# Patient Record
Sex: MALE | Race: WHITE | NOT HISPANIC OR LATINO | Employment: OTHER | URBAN - NONMETROPOLITAN AREA
[De-identification: names, ages, dates, MRNs, and addresses within clinical notes are randomized per-mention and may not be internally consistent; named-entity substitution may affect disease eponyms.]

---

## 2017-12-07 ENCOUNTER — OFFICE VISIT (OUTPATIENT)
Dept: ORTHOPEDIC SURGERY | Facility: CLINIC | Age: 72
End: 2017-12-07

## 2017-12-07 VITALS
DIASTOLIC BLOOD PRESSURE: 92 MMHG | WEIGHT: 199 LBS | HEIGHT: 69 IN | SYSTOLIC BLOOD PRESSURE: 151 MMHG | HEART RATE: 63 BPM | BODY MASS INDEX: 29.47 KG/M2

## 2017-12-07 DIAGNOSIS — S82.831A OTHER CLOSED FRACTURE OF DISTAL END OF RIGHT FIBULA, INITIAL ENCOUNTER: Primary | ICD-10-CM

## 2017-12-07 PROCEDURE — 27786 TREATMENT OF ANKLE FRACTURE: CPT | Performed by: PHYSICIAN ASSISTANT

## 2017-12-07 RX ORDER — TESTOSTERONE CYPIONATE 200 MG/ML
INJECTION, SOLUTION INTRAMUSCULAR
Refills: 0 | COMMUNITY
Start: 2017-11-02
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
## 2017-12-12 DIAGNOSIS — M25.571 RIGHT ANKLE PAIN, UNSPECIFIED CHRONICITY: Primary | ICD-10-CM

## 2017-12-14 ENCOUNTER — OFFICE VISIT (OUTPATIENT)
Dept: ORTHOPEDIC SURGERY | Facility: CLINIC | Age: 72
End: 2017-12-14

## 2017-12-14 ENCOUNTER — HOSPITAL ENCOUNTER (OUTPATIENT)
Dept: GENERAL RADIOLOGY | Facility: HOSPITAL | Age: 72
Discharge: HOME | End: 2017-12-14
Admitting: PHYSICIAN ASSISTANT

## 2017-12-14 VITALS — BODY MASS INDEX: 39.07 KG/M2 | HEIGHT: 60 IN | WEIGHT: 199 LBS

## 2017-12-14 DIAGNOSIS — M25.571 RIGHT ANKLE PAIN, UNSPECIFIED CHRONICITY: ICD-10-CM

## 2017-12-14 DIAGNOSIS — S82.831D OTHER CLOSED FRACTURE OF DISTAL END OF RIGHT FIBULA WITH ROUTINE HEALING, SUBSEQUENT ENCOUNTER: Primary | ICD-10-CM

## 2017-12-14 PROCEDURE — 73610 X-RAY EXAM OF ANKLE: CPT

## 2017-12-14 PROCEDURE — 99024 POSTOP FOLLOW-UP VISIT: CPT | Performed by: PHYSICIAN ASSISTANT

## 2017-12-14 PROCEDURE — 73610 X-RAY EXAM OF ANKLE: CPT | Performed by: RADIOLOGY

## 2017-12-14 NOTE — PROGRESS NOTES
Patient: Meg Trejo  YOB: 1945  Date of Encounter: 12/14/2017        History of Present Illness:     Meg Trejo is a 72 y.o. male seen today for 1 week follow-up and repeat x-ray in cast of nondisplaced right distal fibula fracture.  Patient's last office visit he was immobilized in a short leg cast.  Patient states that over the last 2 days he has had increasing pain, swelling and tightness within the cast.  He reports coldness of his toes and numbness and tingling.  Mild pain to the lateral aspect of the ankle as previous.  Denies any repeat injuries.  He has attempted to maximally elevated with no improvement of the tightness and swelling.     Social History     Occupational History   • Not on file.     Social History Main Topics   • Smoking status: Former Smoker   • Smokeless tobacco: Never Used   • Alcohol use Yes   • Drug use: No   • Sexual activity: Defer       Physical Exam: 72 y.o. male .  General Appearance:    Well appearing, cooperative, in no acute distress.       Patient is alert and oriented x 3.          Musculoskeletal: Examination today the patient's right lower extremity upon removal of short leg fiberglass cast reveals he has moderate generalized swelling into the lower extremity starting at the mid calf and progressing down to the toes.  He has quite noticeable swelling to the dorsum of the foot with 1+ dorsalis pedis pulse, equal to the contralateral foot.  Patient does have intact sensation to palpation with the digits cool to touch.  Patient has limited range of motion secondary to pain and notable fracture.        Radiology:     x-rays: AP, lateral, oblique views right ankle reveals nondisplaced distal fibula fracture without evidence of change in alignment or positioning.  Mortise remains intact.      Assessment:    ICD-10-CM ICD-9-CM   1. Other closed fracture of distal end of right fibula with routine healing, subsequent encounter S82.831D V54.16        Plan:  Patient remained out of the short leg fiberglass cast and he was placed into an equalizer boot that he may remove upon altered sensation.  Patient was encouraged to at time of removal the boot does not implement range of motion of the ankle.  He may remove this for bathing purposes as well.  Patient will continue maximum elevation to help alleviate pain and swelling as well as extensive ecchymosis noted into the lateral ankle.  Patient was instructed that if significant calf tenderness warmth, posterior leg pain and he would report to the emergency department for further evaluation.  Patient reported back in one week for repeat x-rays and further alignment check.      This document was signed by Champ Amaya PA-C December 14, 2017       CC: CHESTER Dougherty

## 2017-12-20 DIAGNOSIS — M25.571 RIGHT ANKLE PAIN, UNSPECIFIED CHRONICITY: Primary | ICD-10-CM

## 2017-12-21 ENCOUNTER — HOSPITAL ENCOUNTER (OUTPATIENT)
Dept: GENERAL RADIOLOGY | Facility: HOSPITAL | Age: 72
Discharge: HOME | End: 2017-12-21
Admitting: PHYSICIAN ASSISTANT

## 2017-12-21 ENCOUNTER — OFFICE VISIT (OUTPATIENT)
Dept: ORTHOPEDIC SURGERY | Facility: CLINIC | Age: 72
End: 2017-12-21

## 2017-12-21 DIAGNOSIS — S82.831D OTHER CLOSED FRACTURE OF DISTAL END OF RIGHT FIBULA WITH ROUTINE HEALING, SUBSEQUENT ENCOUNTER: Primary | ICD-10-CM

## 2017-12-21 DIAGNOSIS — M25.571 RIGHT ANKLE PAIN, UNSPECIFIED CHRONICITY: ICD-10-CM

## 2017-12-21 PROCEDURE — 99024 POSTOP FOLLOW-UP VISIT: CPT | Performed by: PHYSICIAN ASSISTANT

## 2017-12-21 PROCEDURE — 73610 X-RAY EXAM OF ANKLE: CPT | Performed by: RADIOLOGY

## 2017-12-21 PROCEDURE — 73610 X-RAY EXAM OF ANKLE: CPT

## 2017-12-21 NOTE — PROGRESS NOTES
Patient: Meg Trejo  YOB: 1945  Date of Encounter: 12/21/2017        History of Present Illness:     Meg Trejo is a 72 y.o. male seen today for repeat x-rays alignment checked.  Patient is now 2 weeks status post distal fibula fracture right ankle.  Patient reports that the swelling has gone down and he has been compliant with the equalizer boot nonweightbearing status.  He will still have numbness and tingling of the toes upon a dependent position.  He reports progressive swelling and bruising into the digits with continued cold sensation that is comparable to the contralateral foot.  No new complaints.  Denies paresthesias    Social History     Occupational History   • Not on file.     Social History Main Topics   • Smoking status: Former Smoker   • Smokeless tobacco: Never Used   • Alcohol use Yes   • Drug use: No   • Sexual activity: Defer       Physical Exam: 72 y.o. male .  General Appearance:    Well appearing, cooperative, in no acute distress.       Patient is alert and oriented x 3.          Musculoskeletal: Examination the patient's right lower extremity upon removal of the equalizer boot reveals there is moderate generalized swelling to her lower extremity starting at the mid calf and progressing down into the toes.  He has noticeable swelling to the dorsum of the foot with 1+ dorsalis pedis pulse and tibialis posterior.  This is comparable to contralateral foot.  Patient does have intact sensation to light palpation.  Full range of motion of his toes.  Limited range of motion of the ankle secondary to pain and notable fracture.  Neurovascular status grossly intact.      Radiology:       3 views right ankle x-ray today reveals no change in alignment or positioning of the distal fibula fracture.  There is no significant change in mortise alignment or positioning.      Assessment:    ICD-10-CM ICD-9-CM   1. Other closed fracture of distal end of right fibula with routine healing,  subsequent encounter S82.831D V54.16       Plan:    Radiographs continue to reveal stability of the nondisplaced distal fibula fracture.  Mortise is maintained.  He will continue with the equalizer boot with nonweightbearing status over the next 4 weeks.  Repeat x-rays for alignment check and evaluation of periosteal callus formation.  Patient will continue to adhere to maximal elevation to help alleviate the overall pain and swelling and numbness symptoms.    This document was signed by Champ Amaya PA-C December 21, 2017       CC: CHESTER Dougherty

## 2017-12-28 NOTE — PROGRESS NOTES
I have reviewed the notes, assessments, and/or procedures performed by Champ Amaya PA-C, I concur with her/his documentation of Meg Trejo.

## 2018-01-16 DIAGNOSIS — S82.831D OTHER CLOSED FRACTURE OF DISTAL END OF RIGHT FIBULA WITH ROUTINE HEALING, SUBSEQUENT ENCOUNTER: Primary | ICD-10-CM

## 2018-01-18 ENCOUNTER — HOSPITAL ENCOUNTER (OUTPATIENT)
Dept: GENERAL RADIOLOGY | Facility: HOSPITAL | Age: 73
Discharge: HOME | End: 2018-01-18
Admitting: PHYSICIAN ASSISTANT

## 2018-01-18 ENCOUNTER — OFFICE VISIT (OUTPATIENT)
Dept: ORTHOPEDIC SURGERY | Facility: CLINIC | Age: 73
End: 2018-01-18

## 2018-01-18 VITALS — HEIGHT: 60 IN

## 2018-01-18 DIAGNOSIS — S82.831D OTHER CLOSED FRACTURE OF DISTAL END OF RIGHT FIBULA WITH ROUTINE HEALING, SUBSEQUENT ENCOUNTER: ICD-10-CM

## 2018-01-18 DIAGNOSIS — S82.831D OTHER CLOSED FRACTURE OF DISTAL END OF RIGHT FIBULA WITH ROUTINE HEALING, SUBSEQUENT ENCOUNTER: Primary | ICD-10-CM

## 2018-01-18 PROCEDURE — 73610 X-RAY EXAM OF ANKLE: CPT | Performed by: RADIOLOGY

## 2018-01-18 PROCEDURE — 73610 X-RAY EXAM OF ANKLE: CPT

## 2018-01-18 PROCEDURE — 99024 POSTOP FOLLOW-UP VISIT: CPT | Performed by: PHYSICIAN ASSISTANT

## 2018-01-18 NOTE — PROGRESS NOTES
Patient: Meg Trejo  YOB: 1945  Date of Encounter: 01/18/2018        History of Present Illness:     Meg Trejo is a 72 y.o. male seen today secondary to 6 week follow-up right distal fibula fracture.  He has been immobilized equalizer boot since last office visit.  Patient is now six-week status post initial injury.  He reports mild overall swelling with reduction of the majority of the swelling.  Resolution of the majority of the bruising as well.  He has been compliant with nonweightbearing status.  Denies any new complaints paraesthesias.    Social History     Occupational History   • Not on file.     Social History Main Topics   • Smoking status: Former Smoker   • Smokeless tobacco: Never Used   • Alcohol use Yes   • Drug use: No   • Sexual activity: Defer       Physical Exam: 72 y.o. male .  General Appearance:    Well appearing, cooperative, in no acute distress.       Patient is alert and oriented x 3.          Musculoskeletal: Examination the patient's right ankle reveals mild generalized swelling with minimal tenderness to palpation along the distal fibula.  Resolving ecchymosis to the web spaces and to the digits.  Full range of motion with neurovascular status grossly intact      Radiology:       3 views x-rays right ankle reveals no changes in alignment or positioning to the distal fibula fracture.  No notable periosteal callus formation.  Mortise is intact    Assessment:    ICD-10-CM ICD-9-CM   1. Other closed fracture of distal end of right fibula with routine healing, subsequent encounter S82.831D V54.16       Plan:    Patient may begin to slowly ambulate in the equalizer boot for an additional 2 weeks.  He may remove this for gentle range of motion purposes.  The patient will return back for repeat x-rays and further evaluation for determination of early periosteal callus formation.    This document was signed by Champ Amaya PA-C January 18, 2018       CC: Thelma Yoo  APRN

## 2018-01-30 DIAGNOSIS — S82.831D OTHER CLOSED FRACTURE OF DISTAL END OF RIGHT FIBULA WITH ROUTINE HEALING, SUBSEQUENT ENCOUNTER: Primary | ICD-10-CM

## 2018-02-01 ENCOUNTER — OFFICE VISIT (OUTPATIENT)
Dept: ORTHOPEDIC SURGERY | Facility: CLINIC | Age: 73
End: 2018-02-01

## 2018-02-01 ENCOUNTER — HOSPITAL ENCOUNTER (OUTPATIENT)
Dept: GENERAL RADIOLOGY | Facility: HOSPITAL | Age: 73
Discharge: HOME | End: 2018-02-01
Admitting: PHYSICIAN ASSISTANT

## 2018-02-01 VITALS — WEIGHT: 199 LBS | BODY MASS INDEX: 29.47 KG/M2 | HEIGHT: 69 IN

## 2018-02-01 DIAGNOSIS — S82.831D OTHER CLOSED FRACTURE OF DISTAL END OF RIGHT FIBULA WITH ROUTINE HEALING, SUBSEQUENT ENCOUNTER: Primary | ICD-10-CM

## 2018-02-01 PROBLEM — M25.571 RIGHT ANKLE PAIN: Status: ACTIVE | Noted: 2018-02-01

## 2018-02-01 PROCEDURE — 99024 POSTOP FOLLOW-UP VISIT: CPT | Performed by: PHYSICIAN ASSISTANT

## 2018-02-01 PROCEDURE — 73610 X-RAY EXAM OF ANKLE: CPT

## 2018-02-01 PROCEDURE — 73610 X-RAY EXAM OF ANKLE: CPT | Performed by: RADIOLOGY

## 2018-02-01 NOTE — PROGRESS NOTES
Patient: Meg Trejo  YOB: 1945  Date of Encounter: 02/01/2018        History of Present Illness:     Meg Trejo is a 72 y.o. male seen today for scheduled follow up now 8 weeks s/p right distal fibula fracture. He was progressed to ambulating in equalizer boot at last visit. He has been improving with only mild pain upon prolonged standing or walking. No other new complaints. Denies any skin changes or paraesthesias.     Social History     Occupational History   • Not on file.     Social History Main Topics   • Smoking status: Former Smoker   • Smokeless tobacco: Never Used   • Alcohol use Yes   • Drug use: No   • Sexual activity: Defer       Body mass index is 29.39 kg/(m^2).      Physical Exam: 72 y.o. male .  General Appearance:    Well appearing, cooperative, in no acute distress.       Patient is alert and oriented x 3.          Musculoskeletal: right ankle on exam today reveals mild generalized swelling, improved from last exam with resolved ecchymosis. Full ROM, without tenderness to palpation along the lateral ligament complex or distal fibula. NV status remains grossly intact.       Radiology:       XR: 3 views right ankle reveal continued evidence of distal fibula fracture without change in alignment or positioning. No notable callus formation.       Assessment:    ICD-10-CM ICD-9-CM   1. Chronic pain of right ankle M25.571 719.47    G89.29 338.29       Plan:    Progression into normal shoe wear. He is to exercise caution with activity and to avoid re injury. He will return in 4 weeks for final global evaluation. Anticipate periosteal callus formation surrounding at that time. Ice, and relative rest modalities as necessary.         Patient's BMI is above normal parameters. Follow-up plan includes:  educational material.      This document was signed by Champ Amaya PA-C February 1, 2018       CC: CHESTER Dougherty

## 2018-03-01 DIAGNOSIS — S82.831D OTHER CLOSED FRACTURE OF DISTAL END OF RIGHT FIBULA WITH ROUTINE HEALING, SUBSEQUENT ENCOUNTER: Primary | ICD-10-CM

## 2018-03-02 ENCOUNTER — HOSPITAL ENCOUNTER (OUTPATIENT)
Dept: GENERAL RADIOLOGY | Facility: HOSPITAL | Age: 73
Discharge: HOME | End: 2018-03-02
Admitting: PHYSICIAN ASSISTANT

## 2018-03-02 ENCOUNTER — OFFICE VISIT (OUTPATIENT)
Dept: ORTHOPEDIC SURGERY | Facility: CLINIC | Age: 73
End: 2018-03-02

## 2018-03-02 VITALS — HEIGHT: 69 IN | BODY MASS INDEX: 29.47 KG/M2 | WEIGHT: 199 LBS

## 2018-03-02 DIAGNOSIS — S82.831D OTHER CLOSED FRACTURE OF DISTAL END OF RIGHT FIBULA WITH ROUTINE HEALING, SUBSEQUENT ENCOUNTER: Primary | ICD-10-CM

## 2018-03-02 DIAGNOSIS — R20.8 DECREASED SENSATION OF FOOT: ICD-10-CM

## 2018-03-02 DIAGNOSIS — R09.89 OTHER SPECIFIED SYMPTOMS AND SIGNS INVOLVING THE CIRCULATORY AND RESPIRATORY SYSTEMS: ICD-10-CM

## 2018-03-02 DIAGNOSIS — S82.831D OTHER CLOSED FRACTURE OF DISTAL END OF RIGHT FIBULA WITH ROUTINE HEALING, SUBSEQUENT ENCOUNTER: ICD-10-CM

## 2018-03-02 PROCEDURE — 73610 X-RAY EXAM OF ANKLE: CPT

## 2018-03-02 PROCEDURE — 99024 POSTOP FOLLOW-UP VISIT: CPT | Performed by: PHYSICIAN ASSISTANT

## 2018-03-02 PROCEDURE — 73610 X-RAY EXAM OF ANKLE: CPT | Performed by: RADIOLOGY

## 2018-03-06 ENCOUNTER — HOSPITAL ENCOUNTER (OUTPATIENT)
Dept: CARDIOLOGY | Facility: HOSPITAL | Age: 73
Discharge: HOME | End: 2018-03-06
Admitting: PHYSICIAN ASSISTANT

## 2018-03-06 PROCEDURE — 93926 LOWER EXTREMITY STUDY: CPT

## 2018-03-06 PROCEDURE — 93926 LOWER EXTREMITY STUDY: CPT | Performed by: RADIOLOGY

## 2018-03-07 ENCOUNTER — OFFICE VISIT (OUTPATIENT)
Dept: ORTHOPEDIC SURGERY | Facility: CLINIC | Age: 73
End: 2018-03-07

## 2018-03-07 VITALS — WEIGHT: 199 LBS | BODY MASS INDEX: 29.47 KG/M2 | HEIGHT: 69 IN

## 2018-03-07 DIAGNOSIS — S82.831D OTHER CLOSED FRACTURE OF DISTAL END OF RIGHT FIBULA WITH ROUTINE HEALING, SUBSEQUENT ENCOUNTER: Primary | ICD-10-CM

## 2018-03-07 PROCEDURE — 99024 POSTOP FOLLOW-UP VISIT: CPT | Performed by: PHYSICIAN ASSISTANT

## 2018-03-07 NOTE — PROGRESS NOTES
"Meg Trejo   :1945    Date of encounter:2018        HPI:  Meg Trejo is a 73 y.o. male who returns here today for follow-up of recent arterial Doppler.  He is 3 months out from a distal fibula fracture and at his last office visit had significant discoloration and decreased sensation in his foot.  He states that this has improved since his last visit.  He states he's noticed that the discoloration his foot does not seem to be as bad.  He states of it does occur now if he stops and elevates his foot and will subside.  He denies paresthesias.    PMH:   Patient Active Problem List   Diagnosis   • Right ankle pain       Exam:  General Appearance:    73 y.o. male  cooperative, in no acute distress.  Alert and oriented x 3,                   Vitals:    18 1055   Weight: 90.3 kg (199 lb)   Height: 175.3 cm (69\")          Body mass index is 29.39 kg/(m^2).   Examination of the right foot and ankle today reveal minimal swelling.  He has no tenderness on palpation.  He has normal coloration of the foot and good pedal pulse.    Radiology:   Arterial Doppler was normal.    Assessment    ICD-10-CM ICD-9-CM   1. Other closed fracture of distal end of right fibula with routine healing, subsequent encounter S82.831D V54.16       Plan:   73-year-old male who is 3 months out from a distal fibula fracture.  He had a recent arterial Doppler obtained which was found to be normal.  Swelling and sensation of the right lower extremity are improved today.  We have advised to continue weightbearing as tolerated in a regular shoe.  He'll continue to elevate and ice as needed.  He'll return back in 6 weeks for repeat x-rays and evaluation.    Izzy VALENTIN                    "

## 2018-03-13 ENCOUNTER — TRANSCRIBE ORDERS (OUTPATIENT)
Dept: ADMINISTRATIVE | Facility: HOSPITAL | Age: 73
End: 2018-03-13

## 2018-03-13 DIAGNOSIS — H53.2 DOUBLE VISION: Primary | ICD-10-CM

## 2018-03-16 ENCOUNTER — HOSPITAL ENCOUNTER (OUTPATIENT)
Dept: MRI IMAGING | Facility: HOSPITAL | Age: 73
Discharge: HOME | End: 2018-03-16
Admitting: NURSE PRACTITIONER

## 2018-03-16 DIAGNOSIS — H53.2 DOUBLE VISION: ICD-10-CM

## 2018-03-16 PROCEDURE — 70551 MRI BRAIN STEM W/O DYE: CPT | Performed by: RADIOLOGY

## 2018-03-16 PROCEDURE — 70551 MRI BRAIN STEM W/O DYE: CPT

## 2018-03-21 ENCOUNTER — OFFICE VISIT (OUTPATIENT)
Dept: CARDIOLOGY | Facility: CLINIC | Age: 73
End: 2018-03-21

## 2018-03-21 ENCOUNTER — HOSPITAL ENCOUNTER (OUTPATIENT)
Dept: RESPIRATORY THERAPY | Facility: HOSPITAL | Age: 73
Discharge: HOME | End: 2018-03-21
Attending: INTERNAL MEDICINE | Admitting: INTERNAL MEDICINE

## 2018-03-21 VITALS
DIASTOLIC BLOOD PRESSURE: 87 MMHG | RESPIRATION RATE: 16 BRPM | BODY MASS INDEX: 28.94 KG/M2 | SYSTOLIC BLOOD PRESSURE: 139 MMHG | HEIGHT: 69 IN | HEART RATE: 50 BPM | WEIGHT: 195.4 LBS

## 2018-03-21 DIAGNOSIS — I49.1 PAC (PREMATURE ATRIAL CONTRACTION): ICD-10-CM

## 2018-03-21 DIAGNOSIS — I49.1 PAC (PREMATURE ATRIAL CONTRACTION): Primary | ICD-10-CM

## 2018-03-21 DIAGNOSIS — M79.89 LEG SWELLING: ICD-10-CM

## 2018-03-21 PROCEDURE — 93226 XTRNL ECG REC<48 HR SCAN A/R: CPT

## 2018-03-21 PROCEDURE — 93225 XTRNL ECG REC<48 HRS REC: CPT

## 2018-03-21 PROCEDURE — 93000 ELECTROCARDIOGRAM COMPLETE: CPT | Performed by: INTERNAL MEDICINE

## 2018-03-21 PROCEDURE — 99204 OFFICE O/P NEW MOD 45 MIN: CPT | Performed by: INTERNAL MEDICINE
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
## 2018-03-23 PROCEDURE — 93227 XTRNL ECG REC<48 HR R&I: CPT | Performed by: INTERNAL MEDICINE

## 2018-03-28 ENCOUNTER — HOSPITAL ENCOUNTER (OUTPATIENT)
Dept: CARDIOLOGY | Facility: HOSPITAL | Age: 73
Discharge: HOME | End: 2018-03-28
Attending: INTERNAL MEDICINE

## 2018-03-28 ENCOUNTER — HOSPITAL ENCOUNTER (OUTPATIENT)
Dept: CARDIOLOGY | Facility: HOSPITAL | Age: 73
Discharge: HOME | End: 2018-03-28
Attending: INTERNAL MEDICINE | Admitting: INTERNAL MEDICINE

## 2018-03-28 DIAGNOSIS — M79.89 LEG SWELLING: ICD-10-CM

## 2018-03-28 DIAGNOSIS — I49.1 PAC (PREMATURE ATRIAL CONTRACTION): ICD-10-CM

## 2018-03-28 LAB
BH CV ECHO MEAS - % IVS THICK: 35.5 %
BH CV ECHO MEAS - % LVPW THICK: 64.4 %
BH CV ECHO MEAS - ACS: 2.1 CM
BH CV ECHO MEAS - AO MAX PG: 10.6 MMHG
BH CV ECHO MEAS - AO MEAN PG: 5.1 MMHG
BH CV ECHO MEAS - AO ROOT AREA (BSA CORRECTED): 1.6
BH CV ECHO MEAS - AO ROOT AREA: 8.1 CM^2
BH CV ECHO MEAS - AO ROOT DIAM: 3.2 CM
BH CV ECHO MEAS - AO V2 MAX: 162.9 CM/SEC
BH CV ECHO MEAS - AO V2 MEAN: 103.6 CM/SEC
BH CV ECHO MEAS - AO V2 VTI: 33.6 CM
BH CV ECHO MEAS - BSA(HAYCOCK): 2.1 M^2
BH CV ECHO MEAS - BSA: 2 M^2
BH CV ECHO MEAS - BZI_BMI: 28.8 KILOGRAMS/M^2
BH CV ECHO MEAS - BZI_METRIC_HEIGHT: 175.3 CM
BH CV ECHO MEAS - BZI_METRIC_WEIGHT: 88.5 KG
BH CV ECHO MEAS - CONTRAST EF 4CH: 55.9 ML/M^2
BH CV ECHO MEAS - EDV(CUBED): 106.1 ML
BH CV ECHO MEAS - EDV(MOD-SP4): 59 ML
BH CV ECHO MEAS - EDV(TEICH): 104.1 ML
BH CV ECHO MEAS - EF(CUBED): 69 %
BH CV ECHO MEAS - EF(MOD-SP4): 55.9 %
BH CV ECHO MEAS - EF(TEICH): 60.6 %
BH CV ECHO MEAS - ESV(CUBED): 32.9 ML
BH CV ECHO MEAS - ESV(MOD-SP4): 26 ML
BH CV ECHO MEAS - ESV(TEICH): 41.1 ML
BH CV ECHO MEAS - FS: 32.3 %
BH CV ECHO MEAS - IVS/LVPW: 0.83
BH CV ECHO MEAS - IVSD: 0.9 CM
BH CV ECHO MEAS - IVSS: 1.2 CM
BH CV ECHO MEAS - LA DIMENSION: 2.6 CM
BH CV ECHO MEAS - LA/AO: 0.82
BH CV ECHO MEAS - LV DIASTOLIC VOL/BSA (35-75): 28.9 ML/M^2
BH CV ECHO MEAS - LV MASS(C)D: 164.1 GRAMS
BH CV ECHO MEAS - LV MASS(C)DI: 80.3 GRAMS/M^2
BH CV ECHO MEAS - LV MASS(C)S: 171.4 GRAMS
BH CV ECHO MEAS - LV MASS(C)SI: 83.9 GRAMS/M^2
BH CV ECHO MEAS - LV SYSTOLIC VOL/BSA (12-30): 12.7 ML/M^2
BH CV ECHO MEAS - LVIDD: 4.7 CM
BH CV ECHO MEAS - LVIDS: 3.2 CM
BH CV ECHO MEAS - LVLD AP4: 7.4 CM
BH CV ECHO MEAS - LVLS AP4: 6.6 CM
BH CV ECHO MEAS - LVOT AREA (M): 2.8 CM^2
BH CV ECHO MEAS - LVOT AREA: 2.7 CM^2
BH CV ECHO MEAS - LVOT DIAM: 1.9 CM
BH CV ECHO MEAS - LVPWD: 1.1 CM
BH CV ECHO MEAS - LVPWS: 1.8 CM
BH CV ECHO MEAS - MV A MAX VEL: 57.4 CM/SEC
BH CV ECHO MEAS - MV E MAX VEL: 88.8 CM/SEC
BH CV ECHO MEAS - MV E/A: 1.5
BH CV ECHO MEAS - PA ACC SLOPE: 1927 CM/SEC^2
BH CV ECHO MEAS - PA ACC TIME: 0.06 SEC
BH CV ECHO MEAS - PA PR(ACCEL): 53.6 MMHG
BH CV ECHO MEAS - RAP SYSTOLE: 10 MMHG
BH CV ECHO MEAS - RVSP: 38.6 MMHG
BH CV ECHO MEAS - SI(AO): 133.2 ML/M^2
BH CV ECHO MEAS - SI(CUBED): 35.8 ML/M^2
BH CV ECHO MEAS - SI(MOD-SP4): 16.1 ML/M^2
BH CV ECHO MEAS - SI(TEICH): 30.9 ML/M^2
BH CV ECHO MEAS - SV(AO): 272.1 ML
BH CV ECHO MEAS - SV(CUBED): 73.3 ML
BH CV ECHO MEAS - SV(MOD-SP4): 33 ML
BH CV ECHO MEAS - SV(TEICH): 63.1 ML
BH CV ECHO MEAS - TR MAX VEL: 267.3 CM/SEC
LV EF 2D ECHO EST: 65 %
MAXIMAL PREDICTED HEART RATE: 149 BPM
STRESS TARGET HR: 127 BPM

## 2018-03-28 PROCEDURE — 93306 TTE W/DOPPLER COMPLETE: CPT

## 2018-03-28 PROCEDURE — 93306 TTE W/DOPPLER COMPLETE: CPT | Performed by: INTERNAL MEDICINE

## 2018-03-28 PROCEDURE — 93971 EXTREMITY STUDY: CPT | Performed by: RADIOLOGY

## 2018-03-28 PROCEDURE — 93971 EXTREMITY STUDY: CPT

## 2018-04-17 DIAGNOSIS — S82.831D CLOSED FRACTURE OF DISTAL END OF RIGHT FIBULA WITH ROUTINE HEALING, UNSPECIFIED FRACTURE MORPHOLOGY, SUBSEQUENT ENCOUNTER: Primary | ICD-10-CM

## 2018-04-18 ENCOUNTER — OFFICE VISIT (OUTPATIENT)
Dept: ORTHOPEDIC SURGERY | Facility: CLINIC | Age: 73
End: 2018-04-18

## 2018-04-18 ENCOUNTER — HOSPITAL ENCOUNTER (OUTPATIENT)
Dept: GENERAL RADIOLOGY | Facility: HOSPITAL | Age: 73
Discharge: HOME | End: 2018-04-18
Admitting: PHYSICIAN ASSISTANT

## 2018-04-18 VITALS — WEIGHT: 195 LBS | HEIGHT: 69 IN | BODY MASS INDEX: 28.88 KG/M2

## 2018-04-18 DIAGNOSIS — S82.831D CLOSED FRACTURE OF DISTAL END OF RIGHT FIBULA WITH ROUTINE HEALING, UNSPECIFIED FRACTURE MORPHOLOGY, SUBSEQUENT ENCOUNTER: ICD-10-CM

## 2018-04-18 DIAGNOSIS — S82.831D CLOSED FRACTURE OF DISTAL END OF RIGHT FIBULA WITH ROUTINE HEALING, UNSPECIFIED FRACTURE MORPHOLOGY, SUBSEQUENT ENCOUNTER: Primary | ICD-10-CM

## 2018-04-18 PROCEDURE — 99213 OFFICE O/P EST LOW 20 MIN: CPT | Performed by: PHYSICIAN ASSISTANT

## 2018-04-18 PROCEDURE — 73610 X-RAY EXAM OF ANKLE: CPT

## 2018-04-18 PROCEDURE — 73610 X-RAY EXAM OF ANKLE: CPT | Performed by: RADIOLOGY

## 2018-04-18 NOTE — PROGRESS NOTES
"Meg Trejo   :1945    Date of encounter:2018        HPI:  Meg Trejo is a 73 y.o. male who returns here today for follow-up of a right distal fibula fracture.  His proximal he 5 months post injury.  He states the swelling and pain have improved significantly since his previous visit.  He states he is able to walk with less pain and swelling.  He states he will still have some swelling occasionally especially with prolonged standing or walking.  He states he's no longer having any of the disc colorization to his foot.  He denies paresthesias.    PMH:   Patient Active Problem List   Diagnosis   • Right ankle pain   • PAC (premature atrial contraction)   • Leg swelling       Exam:  General Appearance:    73 y.o. male  cooperative, in no acute distress.  Alert and oriented x 3,                   Vitals:    18 1102   Weight: 88.5 kg (195 lb)   Height: 175.3 cm (69\")          Body mass index is 28.8 kg/m².   Examination of the right ankle reveals no significant swelling or ecchymosis.  He has minimal tenderness along the distal fibula.  He has full range of motion without instability.  His neurovascular status is intact.    Radiology:   3 views of the right ankle were reviewed revealing the fracture through the distal fibula with unchanged alignment and evidence of callus formation.    Assessment    ICD-10-CM ICD-9-CM   1. Closed fracture of distal end of right fibula with routine healing, unspecified fracture morphology, subsequent encounter S82.831D V54.16       Plan:   A 73-year-old male 5 months out from a right distal fibula fracture.  X-rays today reveal no change in alignment with evidence of callus formation now on the plain films.  Clinically he is doing well with complaints of occasional swelling.  I have advised to continue continue to bear weight as tolerated and ease back into activities as tolerated.  I will have him return back on an as-needed basis with any further " difficulties.    Izzy VALENTIN

## 2018-05-01 ENCOUNTER — OFFICE VISIT (OUTPATIENT)
Dept: NEUROLOGY | Facility: CLINIC | Age: 73
End: 2018-05-01

## 2018-05-01 VITALS
DIASTOLIC BLOOD PRESSURE: 88 MMHG | SYSTOLIC BLOOD PRESSURE: 128 MMHG | BODY MASS INDEX: 28.88 KG/M2 | HEIGHT: 69 IN | WEIGHT: 195 LBS

## 2018-05-01 DIAGNOSIS — H53.2 DOUBLE VISION: Primary | ICD-10-CM

## 2018-05-01 PROCEDURE — 99204 OFFICE O/P NEW MOD 45 MIN: CPT | Performed by: PSYCHIATRY & NEUROLOGY

## 2018-05-01 RX ORDER — IBUPROFEN 200 MG
200 TABLET ORAL EVERY 6 HOURS PRN
COMMUNITY

## 2018-05-01 NOTE — PROGRESS NOTES
Subjective:    CC: Meg Trejo is seen today in consultation at the request of CHESTER Dougherty for Diplopia       HPI:  Patient is a 73-year-old male without any significant past medical history was doing fine until early March when he was driving and suddenly started seeing double.  It was really intense and hence he had to stop driving but eventually he made it home.  He reports that the double vision was worse when he looked to the left.  Following this, he was seen by ophthalmologist and was found to have a weakness of eye muscle on the left.  Because of absence of obvious  risk factors for having sixth nerve palsy like diabetes, hypertension or hypercholesterolemia, it was decided to have MRI brain now for further evaluation.  He underwent MRI brain on March 18, 2018.  I reviewed it personally and also reviewed with the patient in detail.  It showed T2 hyperintensity involving the white matter of the high parietal area on the right.  There were also tiny T2 hyperintensities in the periventricular region bilaterally.  Following this, he has had a thorough cardiology evaluation done as well which did not reveal any abnormalities.  He reports that since the onset until now, the double vision has improved significantly.  He is able to move his left eye to the left  much more than  what he could do in March.  He was told that the left eye did not move to the left at all.  He reports that whenever he closes one of his eyes, double vision goes away.  He denies any difficulty with breathing chewing or swallowing.  He denies any proximal muscle weakness.  He denies any associated weakness involving upper or lower extremities.    The following portions of the patient's history were reviewed today and updated as appropriate: allergies, social history and problem list.  This document will be scanned to patient's chart.      Current Outpatient Prescriptions:   •  ibuprofen (ADVIL,MOTRIN) 200 MG tablet, Take 200 mg  "by mouth Every 6 (Six) Hours As Needed for Mild Pain ., Disp: , Rfl:   •  Testosterone Cypionate (DEPOTESTOTERONE CYPIONATE) 200 MG/ML injection, , Disp: , Rfl: 0   Past Medical History:   Diagnosis Date   • Fracture, foot       Past Surgical History:   Procedure Laterality Date   • ANKLE SURGERY     • WRIST SURGERY        Family History   Problem Relation Age of Onset   • Heart disease Mother    • Cancer Father       Review of Systems   Constitutional: Negative.    HENT: Negative.    Eyes: Positive for visual disturbance.   Respiratory: Negative.    Cardiovascular: Negative.    Musculoskeletal: Negative.    Skin: Negative.    Allergic/Immunologic: Negative.    Neurological: Positive for headache.   Psychiatric/Behavioral: Negative.      Objective:    /88   Ht 175 cm (68.9\")   Wt 88.5 kg (195 lb)   BMI 28.88 kg/m²     Neurology Exam:  General apperance: NAD.     Mental status: Alert, awake and oriented to time place and person.    Recent and Remote memory: Can recall 3/3 objects at 5 minutes. Can recall historical events.     Attention span and Concentration: Serial 7s: Normal.     Fund of knowledge:  Normal.     Language and Speech: No aphasia or dysarthria.    Naming , Repitition and Comprehension:  Can name objects, repeat a sentence and follow commands. Speech is clear and fluent with good repetition, comprehension, and naming.    CN II to XII: Minimal weakness of the lateral rectus on the left noted.  Minimal double vision when looking to the left.  Rest of the  extraocular muscles are intact.  Rest of the cranial nerves are intact.    Opthalmoscopic Exam: No papilledema.    Motor:  Right UE muscle strength 5/5. Normal tone.     Left UE muscle strength 5/5. Normal tone.      Right LE muscle strength5/5. Normal tone.     Left LE muscle strength 5/5. Normal tone.      Sensory: Normal light touch, vibration and pinprick sensation bilaterally.    DTRs: 2+ bilaterally.    Babinski: Negative " bilaterally.    Co-ordination: Normal finger-to-nose, heel to shin B/L.    Rhomberg: Negative.    Gait: Normal.    Cardiovascular: Regular rate and rhythm without murmur, gallop or rub.    Assessment and Plan:  1. Double vision  Binocular double vision caused by weakness of the left lateral rectus muscle.  Unknown etiology so far.  Double vision has improved significantly since onset back in March.  He doesn't have risk factors such as diabetes, hypertension or  hypercholesterolemia which can certainly cause ischemic/ microvascular extraocular palsy.  MRI brain was reviewed in detail with the patient and I reassured him that he does not have a multiple sclerosis.  The T2 hyperintensity within the high parietal region on the right is nonspecific and can be seen in a patient with a history of headaches as well as smoking in the past.  There is no evidence of acute ischemia.  I would like to have a carotid Doppler done for further evaluation.  I also suggested to start baby aspirin 81 mg daily.  I'll see him back in one month for reassessment.    - Duplex Carotid Ultrasound CAR; Future       No Follow-up on file.     Answers for HPI/ROS submitted by the patient on 4/26/2018   Neurologic complaint  loss of balance: Yes  visual change: Yes  Chronicity: new  Onset: more than 1 month ago  Onset quality: insidiously  Progression since onset: gradually improving  Focality: left-sided  headaches: Yes  Treatments tried: acetaminophen  Improvement on treatment: mild

## 2018-05-04 ENCOUNTER — TELEPHONE (OUTPATIENT)
Dept: NEUROLOGY | Facility: CLINIC | Age: 73
End: 2018-05-04

## 2018-05-04 ENCOUNTER — HOSPITAL ENCOUNTER (OUTPATIENT)
Dept: CARDIOLOGY | Facility: HOSPITAL | Age: 73
Discharge: HOME | End: 2018-05-04
Admitting: PSYCHIATRY & NEUROLOGY

## 2018-05-04 DIAGNOSIS — H53.2 DOUBLE VISION: ICD-10-CM

## 2018-05-04 PROCEDURE — 93880 EXTRACRANIAL BILAT STUDY: CPT | Performed by: RADIOLOGY

## 2018-05-04 PROCEDURE — 93880 EXTRACRANIAL BILAT STUDY: CPT

## 2018-05-04 NOTE — TELEPHONE ENCOUNTER
----- Message from Fernando Solomon MD sent at 5/4/2018  3:16 PM EDT -----  Inform patient normal.  Carotid doppler does not show stenosis of the internal carotid arteries bilaterally.

## 2018-05-04 NOTE — TELEPHONE ENCOUNTER
Contacted Sunshine and informed her of 's instructions, she verbalized understanding and will cb if needed.

## 2018-05-08 ENCOUNTER — OFFICE VISIT (OUTPATIENT)
Dept: CARDIOLOGY | Facility: CLINIC | Age: 73
End: 2018-05-08

## 2018-05-08 VITALS
DIASTOLIC BLOOD PRESSURE: 84 MMHG | HEIGHT: 69 IN | WEIGHT: 196 LBS | HEART RATE: 62 BPM | RESPIRATION RATE: 16 BRPM | SYSTOLIC BLOOD PRESSURE: 127 MMHG | BODY MASS INDEX: 29.03 KG/M2

## 2018-05-08 DIAGNOSIS — I49.1 PAC (PREMATURE ATRIAL CONTRACTION): Primary | ICD-10-CM

## 2018-05-08 PROCEDURE — 99213 OFFICE O/P EST LOW 20 MIN: CPT | Performed by: INTERNAL MEDICINE

## 2018-05-08 NOTE — PROGRESS NOTES
CHESTER Dougherty  Meg Trejo  : 1945  DATE:2018    Patient Active Problem List   Diagnosis   • Right ankle pain   • PAC (premature atrial contraction)   • Leg swelling       Dear CHESTER Dougherty:    Subjective     Meg Trejo is a 73 y.o. male with the above medical problems who is being seen for Follow-up today.  According to the patient has been doing fairly well since his last visit.  He does complain of occasional episodes of palpitations and recently underwent a Holter monitor which showed short episodes of supraventricular tachycardia as well as episodes of bradycardia.  He also continues to complain of occasional episodes of double vision of unclear etiology.  Has been seen by neurology and has been told that he does not have any neurological cause for his double vision.      Current Outpatient Prescriptions:   •  ibuprofen (ADVIL,MOTRIN) 200 MG tablet, Take 200 mg by mouth Every 6 (Six) Hours As Needed for Mild Pain ., Disp: , Rfl:   •  Testosterone Cypionate (DEPOTESTOTERONE CYPIONATE) 200 MG/ML injection, , Disp: , Rfl: 0    The following portions of the patient's history were reviewed and updated as appropriate: allergies, current medications, past family history, past medical history, past social history, past surgical history and problem list.    Review of Systems   Constitution: Negative for chills, diaphoresis and fever.   HENT: Negative for congestion, ear pain and sore throat.    Eyes: Positive for double vision. Negative for blurred vision, pain and redness.   Cardiovascular: Negative for chest pain, leg swelling, orthopnea, palpitations, paroxysmal nocturnal dyspnea and syncope.   Respiratory: Negative for cough, hemoptysis and shortness of breath.    Endocrine: Negative for cold intolerance and heat intolerance.   Hematologic/Lymphatic: Does not bruise/bleed easily.   Skin: Negative for rash.   Musculoskeletal: Negative for arthritis, back pain, joint pain, joint  "swelling, myalgias and stiffness.   Gastrointestinal: Negative for abdominal pain, constipation, diarrhea, nausea and vomiting.   Genitourinary: Negative for dysuria and hematuria.   Neurological: Negative for dizziness, focal weakness and numbness.   Psychiatric/Behavioral: Negative for depression. The patient is not nervous/anxious.        Objective   Blood pressure 127/84, pulse 62, resp. rate 16, height 175 cm (68.9\"), weight 88.9 kg (196 lb).    Physical Exam   Constitutional: He is oriented to person, place, and time. He appears well-developed and well-nourished. No distress.   White male sitting comfortably on chair.   HENT:   Head: Normocephalic and atraumatic.   Eyes: EOM are normal. Pupils are equal, round, and reactive to light.   Neck: Normal range of motion. Neck supple.   Cardiovascular: Normal rate and regular rhythm.  Exam reveals no gallop and no friction rub.    No murmur heard.  Pulmonary/Chest: Effort normal. No respiratory distress. He has no wheezes. He has no rales.   Abdominal: Soft. Bowel sounds are normal. He exhibits no distension. There is no tenderness. There is no guarding.   Musculoskeletal: Normal range of motion. He exhibits no edema.   Neurological: He is alert and oriented to person, place, and time.   Skin: No rash noted. He is not diaphoretic.   Psychiatric: He has a normal mood and affect.       Procedures    Assessment/Plan      1.  PVCs and nonsustained supraventricular tachycardia: Patient with regard be PACs and short runs of nonsustained ventricular tachycardia on Holter monitor.  However she also presented with episode of bradycardia down to the 40s.  I discussed with him the option of starting a beta blocker but at this point the patient states he would rather hold off due to the risk of worsening bradycardia.  States he is currently asymptomatic and would rather not risk of having a low heart rate.  At this point we'll continue to monitor.       Diagnosis Plan   1. PAC " (premature atrial contraction)            Return in about 3 months (around 8/8/2018).    I appreciate the opportunity to participate in this patient's cardiovascular care.    Best Regards    Mohan Tinsley

## 2020-11-07 ENCOUNTER — HOSPITAL ENCOUNTER (EMERGENCY)
Facility: HOSPITAL | Age: 75
Discharge: HOME | End: 2020-11-07
Attending: GENERAL ACUTE CARE HOSPITAL | Admitting: EMERGENCY MEDICINE

## 2020-11-07 VITALS
OXYGEN SATURATION: 99 % | BODY MASS INDEX: 27.4 KG/M2 | HEART RATE: 78 BPM | HEIGHT: 69 IN | RESPIRATION RATE: 18 BRPM | WEIGHT: 185 LBS | DIASTOLIC BLOOD PRESSURE: 88 MMHG | SYSTOLIC BLOOD PRESSURE: 156 MMHG | TEMPERATURE: 98.5 F

## 2020-11-07 DIAGNOSIS — U07.1 COVID-19: Primary | ICD-10-CM

## 2020-11-07 LAB — SARS-COV-2 RDRP RESP QL NAA+PROBE: DETECTED

## 2020-11-07 PROCEDURE — 87635 SARS-COV-2 COVID-19 AMP PRB: CPT | Performed by: PHYSICIAN ASSISTANT

## 2020-11-07 PROCEDURE — C9803 HOPD COVID-19 SPEC COLLECT: HCPCS

## 2020-11-07 PROCEDURE — 99283 EMERGENCY DEPT VISIT LOW MDM: CPT

## 2020-11-08 NOTE — ED PROVIDER NOTES
Subjective   Wife is sick with covid   Pt c/o myalgias, headache for 4-5 days       History provided by:  Patient   used: No    URI  Presenting symptoms: no congestion, no cough, no ear pain, no fever and no sore throat    Severity:  Mild  Onset quality:  Sudden  Duration:  5 days  Timing:  Constant  Progression:  Worsening  Chronicity:  New  Relieved by:  Nothing  Worsened by:  Nothing  Ineffective treatments:  None tried  Associated symptoms: arthralgias, headaches and myalgias    Associated symptoms: no wheezing    Risk factors: sick contacts        Review of Systems   Constitutional: Positive for activity change, appetite change and chills. Negative for fever.   HENT: Negative for congestion, ear pain and sore throat.    Respiratory: Negative for cough, shortness of breath and wheezing.    Cardiovascular: Negative for chest pain.   Gastrointestinal: Negative for diarrhea, nausea and vomiting.   Genitourinary: Negative for dysuria and flank pain.   Musculoskeletal: Positive for arthralgias and myalgias.   Skin: Negative for rash.   Neurological: Positive for headaches.   Psychiatric/Behavioral: The patient is not nervous/anxious.    All other systems reviewed and are negative.      Past Medical History:   Diagnosis Date   • Fracture, foot        No Known Allergies    Past Surgical History:   Procedure Laterality Date   • ANKLE SURGERY     • WRIST SURGERY         Family History   Problem Relation Age of Onset   • Heart disease Mother    • Cancer Father        Social History     Socioeconomic History   • Marital status:      Spouse name: Not on file   • Number of children: Not on file   • Years of education: Not on file   • Highest education level: Not on file   Tobacco Use   • Smoking status: Former Smoker     Types: Cigarettes     Quit date: 3/21/1988     Years since quittin.6   • Smokeless tobacco: Never Used   Substance and Sexual Activity   • Alcohol use: Yes   • Drug use: No    • Sexual activity: Defer           Objective   Physical Exam  Vitals signs and nursing note reviewed.   Constitutional:       Appearance: He is well-developed.   HENT:      Head: Normocephalic.   Neck:      Musculoskeletal: Neck supple.   Cardiovascular:      Rate and Rhythm: Normal rate and regular rhythm.   Pulmonary:      Effort: Pulmonary effort is normal.      Breath sounds: Normal breath sounds.   Abdominal:      General: Bowel sounds are normal.      Palpations: Abdomen is soft.      Tenderness: There is no abdominal tenderness.   Musculoskeletal: Normal range of motion.   Skin:     General: Skin is warm and dry.   Neurological:      Mental Status: He is alert and oriented to person, place, and time.   Psychiatric:         Behavior: Behavior normal.         Thought Content: Thought content normal.         Judgment: Judgment normal.         Procedures           ED Course  ED Course as of Nov 07 2026   Sat Nov 07, 2020 2020 EKG - NSR, no STEMI, no signs of ischemia     [JF]      ED Course User Index  [JF] Ramon Limon Jr., MD                                           [facility]    Final diagnoses:   COVID-19            Angie Pete PA  11/07/20 2026

## 2020-11-09 ENCOUNTER — PATIENT OUTREACH (OUTPATIENT)
Dept: CASE MANAGEMENT | Facility: OTHER | Age: 75
End: 2020-11-09

## 2020-11-09 NOTE — OUTREACH NOTE
ED Potential Covid Discharge Follow-up    Patient was seen in the ED 11/7/20, was diagnosed with Covid-19. Pt's wife has also been diagnosed; ACM spoke with pt's wife/cg. Per cg, pt has not been experiencing fever so far, but has been having body aches. ACM discussed pain management via OTC Tylenol or Motrin as long as not contraindicated by provider or liver/kidney issues as well as epsom salts and muscle rub/creams. ACM discussed self quarantine, advising cg that she and the patient will need to self-quarantine for 14 days from date of diagnosis. ACM instructed on availability of 24/7 nurse line number and Covid Hotline number, cg v/u. Pt and cg to follow up with PCP.     Apolonia Case RN  Ambulatory     11/9/2020, 13:41 EST

## 2021-02-05 ENCOUNTER — IMMUNIZATION (OUTPATIENT)
Dept: VACCINE CLINIC | Facility: HOSPITAL | Age: 76
End: 2021-02-05

## 2021-02-05 PROCEDURE — 0001A: CPT | Performed by: INTERNAL MEDICINE

## 2021-02-05 PROCEDURE — 91300 HC SARSCOV02 VAC 30MCG/0.3ML IM: CPT | Performed by: INTERNAL MEDICINE

## 2021-02-26 ENCOUNTER — IMMUNIZATION (OUTPATIENT)
Dept: VACCINE CLINIC | Facility: HOSPITAL | Age: 76
End: 2021-02-26

## 2021-02-26 PROCEDURE — 91300 HC SARSCOV02 VAC 30MCG/0.3ML IM: CPT | Performed by: INTERNAL MEDICINE

## 2021-02-26 PROCEDURE — 0002A: CPT | Performed by: INTERNAL MEDICINE

## 2021-07-01 ENCOUNTER — TRANSCRIBE ORDERS (OUTPATIENT)
Dept: ADMINISTRATIVE | Facility: HOSPITAL | Age: 76
End: 2021-07-01

## 2021-07-01 DIAGNOSIS — M54.2 NECK PAIN: Primary | ICD-10-CM

## 2021-07-02 ENCOUNTER — HOSPITAL ENCOUNTER (OUTPATIENT)
Dept: MRI IMAGING | Facility: HOSPITAL | Age: 76
Discharge: HOME | End: 2021-07-02
Admitting: NURSE PRACTITIONER

## 2021-07-02 DIAGNOSIS — M54.2 NECK PAIN: ICD-10-CM

## 2021-07-02 PROCEDURE — 72141 MRI NECK SPINE W/O DYE: CPT | Performed by: RADIOLOGY

## 2021-07-02 PROCEDURE — 72141 MRI NECK SPINE W/O DYE: CPT

## 2021-07-09 ENCOUNTER — APPOINTMENT (OUTPATIENT)
Dept: MRI IMAGING | Facility: HOSPITAL | Age: 76
End: 2021-07-09

## 2021-07-17 NOTE — PROGRESS NOTES
"Meg Trejo   :1945    Date of encounter:2018        HPI:  Meg Trejo is a 73 y.o. male who returns here today for follow-up of a right distal fibula fracture.  Date of injury was 2017.  He's been back in a regular shoe for the last month now.  He still complaining of swelling and pain especially with prolonged walking.  He states it's not gotten any significantly better since his last visit.  He also adds is not any worse.  He also states that his foot has been somewhat discolored but improved some when he elevates it.    PMH:   Patient Active Problem List   Diagnosis   • Right ankle pain       Exam:  General Appearance:    73 y.o. male  cooperative, in no acute distress.  Alert and oriented x 3,                   Vitals:    18 1322   Weight: 90.3 kg (199 lb)   Height: 175.3 cm (69\")          Body mass index is 29.39 kg/(m^2).   Examination of the right ankle reveals generalized swelling with tenderness along the distal fibula.  He has good motion.  He does have discoloration relation along the toes and foot.  This does improve slightly with elevation.  He has decreased pedal pulses compared to the contralateral side.  His sensation is intact.    Radiology:   3 views of the right ankle were reviewed revealing the distal fibula fracture with unchanged alignment.  There is still no notable callus formation.    Assessment    ICD-10-CM ICD-9-CM   1. Other closed fracture of distal end of right fibula with routine healing, subsequent encounter S82.831D V54.16   2. Decreased sensation of foot R20.8 782.0   3. Other specified symptoms and signs involving the circulatory and respiratory systems  R09.89 785.9       Plan:   A 73-year-old male 3 months out from a right distal fibula fracture.  X-rays today reveal no significant change in alignment.  He still doesn't have significant callus formation along the fracture site.  Clinically though he has minimal pain and swelling with prolonged " walking.  My biggest concern today is his decreased pedal pulse and disc cauterization the foot.  I will like to order an arterial Doppler of the lower extremity further evaluate his circulation.  She'll continue with his regular shoe and weightbearing as tolerated.  He'll return back after the Doppler.    Izzy VALENTIN               No

## 2024-08-21 ENCOUNTER — APPOINTMENT (OUTPATIENT)
Dept: GENERAL RADIOLOGY | Facility: HOSPITAL | Age: 79
End: 2024-08-21
Payer: MEDICARE

## 2024-08-21 ENCOUNTER — APPOINTMENT (OUTPATIENT)
Dept: MRI IMAGING | Facility: HOSPITAL | Age: 79
End: 2024-08-21
Payer: MEDICARE

## 2024-08-21 ENCOUNTER — HOSPITAL ENCOUNTER (OUTPATIENT)
Facility: HOSPITAL | Age: 79
LOS: 1 days | Discharge: HOME | End: 2024-08-22
Attending: EMERGENCY MEDICINE | Admitting: INTERNAL MEDICINE
Payer: MEDICARE

## 2024-08-21 DIAGNOSIS — R00.1 SYMPTOMATIC BRADYCARDIA: Primary | ICD-10-CM

## 2024-08-21 LAB
ALBUMIN SERPL-MCNC: 4.1 G/DL (ref 3.5–5.2)
ALBUMIN/GLOB SERPL: 1.2 G/DL
ALP SERPL-CCNC: 48 U/L (ref 39–117)
ALT SERPL W P-5'-P-CCNC: 20 U/L (ref 1–41)
ANION GAP SERPL CALCULATED.3IONS-SCNC: 12.5 MMOL/L (ref 5–15)
AST SERPL-CCNC: 18 U/L (ref 1–40)
BASOPHILS # BLD AUTO: 0.02 10*3/MM3 (ref 0–0.2)
BASOPHILS NFR BLD AUTO: 0.3 % (ref 0–1.5)
BILIRUB SERPL-MCNC: 0.5 MG/DL (ref 0–1.2)
BUN SERPL-MCNC: 19 MG/DL (ref 8–23)
BUN/CREAT SERPL: 19.2 (ref 7–25)
CALCIUM SPEC-SCNC: 9.6 MG/DL (ref 8.6–10.5)
CHLORIDE SERPL-SCNC: 102 MMOL/L (ref 98–107)
CO2 SERPL-SCNC: 26.5 MMOL/L (ref 22–29)
CREAT SERPL-MCNC: 0.99 MG/DL (ref 0.76–1.27)
DEPRECATED RDW RBC AUTO: 43.2 FL (ref 37–54)
EGFRCR SERPLBLD CKD-EPI 2021: 78.5 ML/MIN/1.73
EOSINOPHIL # BLD AUTO: 0.07 10*3/MM3 (ref 0–0.4)
EOSINOPHIL NFR BLD AUTO: 1.1 % (ref 0.3–6.2)
ERYTHROCYTE [DISTWIDTH] IN BLOOD BY AUTOMATED COUNT: 12.3 % (ref 12.3–15.4)
GLOBULIN UR ELPH-MCNC: 3.4 GM/DL
GLUCOSE SERPL-MCNC: 92 MG/DL (ref 65–99)
HCT VFR BLD AUTO: 47.7 % (ref 37.5–51)
HGB BLD-MCNC: 15.7 G/DL (ref 13–17.7)
IMM GRANULOCYTES # BLD AUTO: 0.02 10*3/MM3 (ref 0–0.05)
IMM GRANULOCYTES NFR BLD AUTO: 0.3 % (ref 0–0.5)
LYMPHOCYTES # BLD AUTO: 1.93 10*3/MM3 (ref 0.7–3.1)
LYMPHOCYTES NFR BLD AUTO: 30.8 % (ref 19.6–45.3)
MAGNESIUM SERPL-MCNC: 2.3 MG/DL (ref 1.6–2.4)
MCH RBC QN AUTO: 31.5 PG (ref 26.6–33)
MCHC RBC AUTO-ENTMCNC: 32.9 G/DL (ref 31.5–35.7)
MCV RBC AUTO: 95.6 FL (ref 79–97)
MONOCYTES # BLD AUTO: 0.39 10*3/MM3 (ref 0.1–0.9)
MONOCYTES NFR BLD AUTO: 6.2 % (ref 5–12)
NEUTROPHILS NFR BLD AUTO: 3.84 10*3/MM3 (ref 1.7–7)
NEUTROPHILS NFR BLD AUTO: 61.3 % (ref 42.7–76)
NRBC BLD AUTO-RTO: 0 /100 WBC (ref 0–0.2)
NT-PROBNP SERPL-MCNC: 476.6 PG/ML (ref 0–1800)
PLATELET # BLD AUTO: 194 10*3/MM3 (ref 140–450)
PMV BLD AUTO: 9.2 FL (ref 6–12)
POTASSIUM SERPL-SCNC: 3.8 MMOL/L (ref 3.5–5.2)
PROT SERPL-MCNC: 7.5 G/DL (ref 6–8.5)
RBC # BLD AUTO: 4.99 10*6/MM3 (ref 4.14–5.8)
SODIUM SERPL-SCNC: 141 MMOL/L (ref 136–145)
TROPONIN T SERPL HS-MCNC: 14 NG/L
TSH SERPL DL<=0.05 MIU/L-ACNC: 1.64 UIU/ML (ref 0.27–4.2)
WBC NRBC COR # BLD AUTO: 6.27 10*3/MM3 (ref 3.4–10.8)

## 2024-08-21 PROCEDURE — 99223 1ST HOSP IP/OBS HIGH 75: CPT | Performed by: INTERNAL MEDICINE

## 2024-08-21 PROCEDURE — 25010000002 ATROPINE SULFATE: Performed by: EMERGENCY MEDICINE

## 2024-08-21 PROCEDURE — 84443 ASSAY THYROID STIM HORMONE: CPT | Performed by: INTERNAL MEDICINE

## 2024-08-21 PROCEDURE — 83735 ASSAY OF MAGNESIUM: CPT | Performed by: EMERGENCY MEDICINE

## 2024-08-21 PROCEDURE — 85025 COMPLETE CBC W/AUTO DIFF WBC: CPT | Performed by: EMERGENCY MEDICINE

## 2024-08-21 PROCEDURE — 84484 ASSAY OF TROPONIN QUANT: CPT | Performed by: EMERGENCY MEDICINE

## 2024-08-21 PROCEDURE — 93010 ELECTROCARDIOGRAM REPORT: CPT | Performed by: SPECIALIST

## 2024-08-21 PROCEDURE — G0378 HOSPITAL OBSERVATION PER HR: HCPCS

## 2024-08-21 PROCEDURE — 70551 MRI BRAIN STEM W/O DYE: CPT | Performed by: RADIOLOGY

## 2024-08-21 PROCEDURE — 80053 COMPREHEN METABOLIC PANEL: CPT | Performed by: EMERGENCY MEDICINE

## 2024-08-21 PROCEDURE — 99285 EMERGENCY DEPT VISIT HI MDM: CPT

## 2024-08-21 PROCEDURE — 96374 THER/PROPH/DIAG INJ IV PUSH: CPT

## 2024-08-21 PROCEDURE — 71045 X-RAY EXAM CHEST 1 VIEW: CPT | Performed by: RADIOLOGY

## 2024-08-21 PROCEDURE — 83880 ASSAY OF NATRIURETIC PEPTIDE: CPT | Performed by: EMERGENCY MEDICINE

## 2024-08-21 PROCEDURE — 70551 MRI BRAIN STEM W/O DYE: CPT

## 2024-08-21 PROCEDURE — 36415 COLL VENOUS BLD VENIPUNCTURE: CPT

## 2024-08-21 PROCEDURE — 93005 ELECTROCARDIOGRAM TRACING: CPT | Performed by: EMERGENCY MEDICINE

## 2024-08-21 PROCEDURE — 93005 ELECTROCARDIOGRAM TRACING: CPT

## 2024-08-21 PROCEDURE — 71045 X-RAY EXAM CHEST 1 VIEW: CPT

## 2024-08-21 RX ORDER — ACETAMINOPHEN 160 MG/5ML
650 SOLUTION ORAL EVERY 4 HOURS PRN
Status: DISCONTINUED | OUTPATIENT
Start: 2024-08-21 | End: 2024-08-22 | Stop reason: HOSPADM

## 2024-08-21 RX ORDER — ACETAMINOPHEN 650 MG/1
650 SUPPOSITORY RECTAL EVERY 4 HOURS PRN
Status: DISCONTINUED | OUTPATIENT
Start: 2024-08-21 | End: 2024-08-22 | Stop reason: HOSPADM

## 2024-08-21 RX ORDER — SODIUM CHLORIDE 0.9 % (FLUSH) 0.9 %
10 SYRINGE (ML) INJECTION EVERY 12 HOURS SCHEDULED
Status: DISCONTINUED | OUTPATIENT
Start: 2024-08-21 | End: 2024-08-22 | Stop reason: HOSPADM

## 2024-08-21 RX ORDER — SODIUM CHLORIDE 9 MG/ML
40 INJECTION, SOLUTION INTRAVENOUS AS NEEDED
Status: DISCONTINUED | OUTPATIENT
Start: 2024-08-21 | End: 2024-08-22 | Stop reason: HOSPADM

## 2024-08-21 RX ORDER — TESTOSTERONE CYPIONATE 200 MG/ML
200 INJECTION, SOLUTION INTRAMUSCULAR
Status: CANCELLED | OUTPATIENT
Start: 2024-09-01

## 2024-08-21 RX ORDER — SODIUM CHLORIDE 0.9 % (FLUSH) 0.9 %
10 SYRINGE (ML) INJECTION AS NEEDED
Status: DISCONTINUED | OUTPATIENT
Start: 2024-08-21 | End: 2024-08-22 | Stop reason: HOSPADM

## 2024-08-21 RX ORDER — ACETAMINOPHEN 325 MG/1
650 TABLET ORAL EVERY 4 HOURS PRN
Status: DISCONTINUED | OUTPATIENT
Start: 2024-08-21 | End: 2024-08-22 | Stop reason: HOSPADM

## 2024-08-21 RX ORDER — NITROGLYCERIN 0.4 MG/1
0.4 TABLET SUBLINGUAL
Status: DISCONTINUED | OUTPATIENT
Start: 2024-08-21 | End: 2024-08-22 | Stop reason: HOSPADM

## 2024-08-21 RX ADMIN — ATROPINE SULFATE 0.5 MG: 0.1 INJECTION INTRAVENOUS at 16:55

## 2024-08-21 RX ADMIN — Medication 10 ML: at 21:51

## 2024-08-21 NOTE — CASE MANAGEMENT/SOCIAL WORK
Discharge Planning Assessment   Surjit     Patient Name: Meg Trejo  MRN: [id number]  Today's Date: 8/21/2024    Admit Date: 8/21/2024    Plan: Spoke with patient and spouse at bedside. Patient lives at home and will return at discharge. Patient has no POA or Living Will. Patient uses no DME, Oxygen or HH. Patients PCP Thelma Yoo and pharmacy [facility]. Patient denies any needs at this time. Patients spouse will transport home at discharge.   Discharge Needs Assessment       Row Name 08/21/24 1733       Living Environment    People in Home spouse    Name(s) of People in Home Luzma Trejo Spouse [phone]    Potentially Unsafe Housing Conditions none    In the past 12 months has the electric, gas, oil, or water company threatened to shut off services in your home? No    Primary Care Provided by self    Provides Primary Care For no one    Family Caregiver if Needed spouse    Quality of Family Relationships helpful;involved;supportive    Able to Return to Prior Arrangements yes       Resource/Environmental Concerns    Resource/Environmental Concerns none    Transportation Concerns none       Transportation Needs    In the past 12 months, has lack of transportation kept you from medical appointments or from getting medications? no    In the past 12 months, has lack of transportation kept you from meetings, work, or from getting things needed for daily living? No       Food Insecurity    Within the past 12 months, you worried that your food would run out before you got the money to buy more. Never true    Within the past 12 months, the food you bought just didn't last and you didn't have money to get more. Never true       Transition Planning    Patient/Family Anticipates Transition to home with family    Patient/Family Anticipated Services at Transition none    Transportation Anticipated family or friend will provide       Discharge Needs Assessment    Readmission Within the Last 30 Days no  previous admission in last 30 days    Equipment Currently Used at Home none    Concerns to be Addressed discharge planning    Anticipated Changes Related to Illness none    Equipment Needed After Discharge none                   Discharge Plan       Row Name 08/21/24 1392       Plan    Plan Spoke with patient and spouse at bedside. Patient lives at home and will return at discharge. Patient has no POA or Living Will. Patient uses no DME, Oxygen or HH. Patients PCP Thelma Yoo and pharmacy [facility]. Patient denies any needs at this time. Patients spouse will transport home at discharge.    Patient/Family in Agreement with Plan yes                 Almita Ruby

## 2024-08-21 NOTE — ED PROVIDER NOTES
Subjective   History of Present Illness  Patient presents to eR with dizziness and vomiting since Monday. He was sent to eR by his PCP for slow heart rtae    Palpitations  Palpitations quality:  Slow  Onset quality:  Gradual  Duration:  3 days  Timing:  Constant  Chronicity:  New  Associated symptoms: nausea and vomiting        Review of Systems   Constitutional:  Positive for activity change and fatigue.   HENT: Negative.     Eyes: Negative.    Respiratory: Negative.     Cardiovascular:  Positive for palpitations.   Gastrointestinal:  Positive for nausea and vomiting.   Endocrine: Negative.    Genitourinary: Negative.    Musculoskeletal: Negative.    Allergic/Immunologic: Negative.    Neurological: Negative.    Hematological: Negative.        Past Medical History:   Diagnosis Date    Fracture, foot        No Known Allergies    Past Surgical History:   Procedure Laterality Date    ANKLE SURGERY      WRIST SURGERY         Family History   Problem Relation Age of Onset    Heart disease Mother     Cancer Father        Social History     Socioeconomic History    Marital status:    Tobacco Use    Smoking status: Former     Current packs/day: 0.00     Types: Cigarettes     Quit date: 3/21/1988     Years since quittin.4    Smokeless tobacco: Never   Substance and Sexual Activity    Alcohol use: Yes    Drug use: No    Sexual activity: Defer           Objective   Physical Exam  Vitals and nursing note reviewed.   Constitutional:       Appearance: Normal appearance.   HENT:      Head: Normocephalic.      Nose: Nose normal.      Mouth/Throat:      Mouth: Mucous membranes are moist.   Cardiovascular:      Rate and Rhythm: Bradycardia present. Rhythm irregular.      Pulses: Normal pulses.   Pulmonary:      Effort: Pulmonary effort is normal.   Abdominal:      General: Abdomen is flat.   Skin:     General: Skin is warm and dry.   Neurological:      General: No focal deficit present.      Mental Status: He is alert.    Psychiatric:         Mood and Affect: Mood normal.         Procedures           ED Course  ED Course as of 08/21/24 1825   Wed Aug 21, 2024   1600 ECG 15:49 Sinus bradycardia with marked sinus arrhythmia. Rate 49. QT/qTc 458/413 [RUDY]   1656 Dr Sapp consulted, will follow in hosp with hospitalist [RUDY]      ED Course User Index  [RUDY] Marco Pete MD                                             Medical Decision Making  Problems Addressed:  Symptomatic bradycardia: complicated acute illness or injury    Amount and/or Complexity of Data Reviewed  Labs: ordered.  Radiology: ordered.    Risk  Prescription drug management.        Final diagnoses:   Symptomatic bradycardia       ED Disposition  ED Disposition       ED Disposition   Decision to Admit    Condition   --    Comment   Level of Care: Med/Surg [1]   Diagnosis: Bradycardia [[id number]]                 No follow-up provider specified.       Medication List      No changes were made to your prescriptions during this visit.            Marco Pete MD  08/21/24 1825

## 2024-08-21 NOTE — H&P
[facility]    Patient Identification:  Name:  Meg Trejo  Age:  79 y.o.  Sex:  male  :  1945  MRN:  [id number]   Visit Number:  [id number]  Primary Care Physician:  Thelma Yoo APRN     2024   Chief complaint: Sent in by PCP for slow heart rate.    History of presenting illness:  79 y.o. male with no significant past medical history was referred to the emergency room due to slow heart rate.  Patient reported that his illness started 3 days ago when he started feeling dizzy and lightheaded.  His lightheadedness and dizziness was followed by intractable nausea and vomiting.  Next morning he felt a little better ate some food and developed dizziness with vomiting again. Patient reports that his dizziness is most severe when he tries to stand up. Earlier today he felt better and actually wanted to cancel his appointment with the PCP but on arrival at the PCPs office he was told he has slow heart rate and was referred to the emergency room.  He was noted to be bradycardic in the ED.  He received atropine and his heart rate has improved into the 60s.  He denies any associated chest pain.  He has not had any abdominal pain.  Denies any diarrhea.  He did not start any new medication.  He was not exposed to any sick contact.  His lab workup in the emergency room were all normal.  ---------------------------------------------------------------------------------------------------------------------   Review of Systems   Constitutional:  Negative for appetite change.   HENT: Negative.     Eyes: Negative.    Respiratory: Negative.     Cardiovascular: Negative.    Gastrointestinal:  Positive for nausea and vomiting. Negative for abdominal pain and diarrhea.   Endocrine: Negative.    Genitourinary: Negative.    Musculoskeletal: Negative.    Neurological:  Positive for dizziness and light-headedness.   Psychiatric/Behavioral:  Positive for sleep disturbance.        ---------------------------------------------------------------------------------------------------------------------   Past Medical History:   Diagnosis Date    Fracture, foot      Past Surgical History:   Procedure Laterality Date    ANKLE SURGERY      WRIST SURGERY       Family History   Problem Relation Age of Onset    Heart disease Mother     Cancer Father      Social History     Socioeconomic History    Marital status:    Tobacco Use    Smoking status: Former     Current packs/day: 0.00     Types: Cigarettes     Quit date: 3/21/1988     Years since quittin.4    Smokeless tobacco: Never   Substance and Sexual Activity    Alcohol use: Yes    Drug use: No    Sexual activity: Defer     ---------------------------------------------------------------------------------------------------------------------   Allergies:  Patient has no known allergies.  ---------------------------------------------------------------------------------------------------------------------   Prior to Admission Medications       Prescriptions Last Dose Informant Patient Reported? Taking?    ibuprofen (ADVIL,MOTRIN) 200 MG tablet   Yes No    Take 200 mg by mouth Every 6 (Six) Hours As Needed for Mild Pain .    Testosterone Cypionate (DEPOTESTOTERONE CYPIONATE) 200 MG/ML injection   Yes No          ---------------------------------------------------------------------------------------------------------------------   Vital Signs:  Temp:  [97.9 °F (36.6 °C)] 97.9 °F (36.6 °C)  Heart Rate:  [52-57] 57  Resp:  [18] 18  BP: (159-174)/(81-93) 159/93      24  1552   Weight: 81.2 kg (179 lb)     Body mass index is 26.43 kg/m².  ---------------------------------------------------------------------------------------------------------------------   Physical Exam:  Constitutional:  Well-developed and well-nourished.  No respiratory distress.      HENT:  Head: Normocephalic and atraumatic.  Mouth:  Moist mucous membranes.    Eyes:   "Conjunctivae and EOM are normal.  Pupils are equal, round, and reactive to light.  No scleral icterus.  Neck:  Neck supple.  No JVD present.    Cardiovascular: Bradycardia, regular rhythm and normal heart sounds with no murmur.  Pulmonary/Chest:  No respiratory distress, no wheezes, no crackles, with normal breath sounds and good air movement.  Abdominal:  Soft.  Bowel sounds are normal.  No distension and no tenderness.   Musculoskeletal:  No edema, no tenderness, and no deformity.  No red or swollen joints anywhere.    Neurological:  Alert and oriented to person, place, and time.  No cranial nerve deficit.  No tongue deviation.  No facial droop.  No slurred speech.   Skin:  Skin is warm and dry.  No rash noted.  No pallor.   Psychiatric:  Normal mood and affect.  Behavior is normal.  Judgment and thought content normal.   Peripheral vascular:  No edema and strong pulses on all 4 extremities.  Genitourinary: Deferred  ---------------------------------------------------------------------------------------------------------------------  EKG:   .  Sinus bradycardia with sinus arrhythmia rate 52 with LVH criteria  ---------------------------------------------------------------------------------------------------------------------   Results from last 7 days   Lab Units 08/21/24  1618   WBC 10*3/mm3 6.27   HEMOGLOBIN g/dL 15.7   HEMATOCRIT % 47.7   MCV fL 95.6   MCHC g/dL 32.9   PLATELETS 10*3/mm3 194         Results from last 7 days   Lab Units 08/21/24  1618   SODIUM mmol/L 141   POTASSIUM mmol/L 3.8   MAGNESIUM mg/dL 2.3   CHLORIDE mmol/L 102   CO2 mmol/L 26.5   BUN mg/dL 19   CREATININE mg/dL 0.99   CALCIUM mg/dL 9.6   GLUCOSE mg/dL 92   ALBUMIN g/dL 4.1   BILIRUBIN mg/dL 0.5   ALK PHOS U/L 48   AST (SGOT) U/L 18   ALT (SGPT) U/L 20   Estimated Creatinine Clearance: 71.8 mL/min (by C-G formula based on SCr of 0.99 mg/dL).  No results found for: \"AMMONIA\"  Results from last 7 days   Lab Units 08/21/24  1618   HSTROP T " "ng/L 14     Results from last 7 days   Lab Units 08/21/24  1618   PROBNP pg/mL 476.6     No results found for: \"HGBA1C\"  Lab Results   Component Value Date    TSH 1.640 08/21/2024     No results found for: \"PREGTESTUR\", \"PREGSERUM\", \"HCG\", \"HCGQUANT\"  Pain Management Panel           No data to display              No results found for: \"BLOODCX\"  No results found for: \"URINECX\"  No results found for: \"WOUNDCX\"  No results found for: \"STOOLCX\"        I have personally looked at the labs and they are stated above.  ---------------------------------------------------------------------------------------------------------------------  Imaging Results (Last 7 Days)       Procedure Component Value Units Date/Time    XR Chest 1 View [[id number]] Collected: 08/21/24 1633     Updated: 08/21/24 1635    Narrative:      PROCEDURE: XR CHEST 1 VW-       HISTORY: weakness     COMPARISON: None.     FINDINGS: The heart is normal in size. The mediastinum is unremarkable.  There are low lung volumes with a left basilar opacity likely  atelectasis. There is no pneumothorax. There are no acute osseous  abnormalities.       Impression:      Low lung volumes with a left basilar opacity likely  atelectasis.        This report was finalized on 8/21/2024 4:33 PM by Renny Zhu M.D..               I have personally reviewed the radiology images and read the final radiology report.  ---------------------------------------------------------------------------------------------------------------------  Assessment:  Sinus bradycardia with sinus arrhythmia probably this may be sick sinus syndrome  Dizziness/vertigo  Intractable nausea and vomiting.  Elevated blood pressure with no diagnosis of hypertension    Plan:  I reviewed patient records and in 2018 patient had sinus bradycardia.  His heart rate then was 59.  And he had PVCs.  Same period patient had MRI of the brain done because he had diplopia  At this time I will place patient in " observation status.  Continuous telemetry monitoring.  Avoid any rate controlling medication especially beta-blocker  As needed atropine  Cardiology consult  Check TSH  Obtain echocardiogram  MRI of the brain stat to rule out posterior circulation infarct  Will allow for permissive hypertension until CVA is ruled out.  Melatonin for insomnia    Prophylaxis:   DVT-SCD    Disposition: Observation status.  Patient is full code  I discussed the plan of care with the patient and the spouse was at the bedside.      Michele Sandoval MD  08/21/24  18:27 EDT    Dragon disclaimer:  Part of this note may be an electronic transcription/translation of spoken language to printed text using the Dragon Dictation System.

## 2024-08-22 ENCOUNTER — APPOINTMENT (OUTPATIENT)
Dept: CARDIOLOGY | Facility: HOSPITAL | Age: 79
End: 2024-08-22
Payer: MEDICARE

## 2024-08-22 VITALS
RESPIRATION RATE: 18 BRPM | HEIGHT: 69 IN | SYSTOLIC BLOOD PRESSURE: 160 MMHG | OXYGEN SATURATION: 98 % | DIASTOLIC BLOOD PRESSURE: 91 MMHG | BODY MASS INDEX: 28.05 KG/M2 | HEART RATE: 59 BPM | TEMPERATURE: 97.8 F | WEIGHT: 189.38 LBS

## 2024-08-22 PROBLEM — R42 DIZZINESS: Status: ACTIVE | Noted: 2024-08-22

## 2024-08-22 PROBLEM — R11.2 NAUSEA AND VOMITING: Status: ACTIVE | Noted: 2024-08-22

## 2024-08-22 PROBLEM — I48.91 NEW ONSET ATRIAL FIBRILLATION: Status: ACTIVE | Noted: 2024-08-22

## 2024-08-22 PROBLEM — I10 ESSENTIAL HYPERTENSION: Status: ACTIVE | Noted: 2024-08-22

## 2024-08-22 PROBLEM — I48.0 PAF (PAROXYSMAL ATRIAL FIBRILLATION): Status: ACTIVE | Noted: 2024-08-22

## 2024-08-22 PROCEDURE — 93010 ELECTROCARDIOGRAM REPORT: CPT | Performed by: SPECIALIST

## 2024-08-22 PROCEDURE — 93005 ELECTROCARDIOGRAM TRACING: CPT | Performed by: INTERNAL MEDICINE

## 2024-08-22 PROCEDURE — 99238 HOSP IP/OBS DSCHRG MGMT 30/<: CPT | Performed by: INTERNAL MEDICINE

## 2024-08-22 PROCEDURE — 99203 OFFICE O/P NEW LOW 30 MIN: CPT | Performed by: NURSE PRACTITIONER

## 2024-08-22 PROCEDURE — 93306 TTE W/DOPPLER COMPLETE: CPT

## 2024-08-22 PROCEDURE — G0378 HOSPITAL OBSERVATION PER HR: HCPCS

## 2024-08-22 PROCEDURE — 93306 TTE W/DOPPLER COMPLETE: CPT | Performed by: INTERNAL MEDICINE

## 2024-08-22 RX ORDER — LOSARTAN POTASSIUM 25 MG/1
25 TABLET ORAL
Qty: 30 TABLET | Refills: 0 | Status: SHIPPED | OUTPATIENT
Start: 2024-08-23 | End: 2024-09-22

## 2024-08-22 RX ORDER — LOSARTAN POTASSIUM 25 MG/1
25 TABLET ORAL
Status: DISCONTINUED | OUTPATIENT
Start: 2024-08-22 | End: 2024-08-22 | Stop reason: HOSPADM

## 2024-08-22 RX ADMIN — Medication 10 ML: at 08:56

## 2024-08-22 RX ADMIN — LOSARTAN POTASSIUM 25 MG: 25 TABLET, FILM COATED ORAL at 08:55

## 2024-08-22 NOTE — PROGRESS NOTES
[facility] HOSPITALIST PROGRESS NOTE     Patient Identification:  Name:  Meg Trejo  Age:  79 y.o.  Sex:  male  :  1945  MRN:  [id number]  Visit Number:  [id number]  Primary Care Provider:  Thelma Yoo APRN    Length of stay:  0        Subjective: Seen and evaluated with the nursing staff.  Patient says he feels fine.  Has not had any dizziness.  Tolerated his breakfast with no nausea or vomiting.  ----------------------------------------------------------------------------------------------------------------------  Current Hospital Meds:  losartan, 25 mg, Oral, Q24H  sodium chloride, 10 mL, Intravenous, Q12H         ----------------------------------------------------------------------------------------------------------------------  Vital Signs:  Temp:  [97.8 °F (36.6 °C)-98.5 °F (36.9 °C)] 97.8 °F (36.6 °C)  Heart Rate:  [52-65] 59  Resp:  [18] 18  BP: (148-178)/(77-93) 160/91      24  1552 24  1944 24  0530   Weight: 81.2 kg (179 lb) 85.9 kg (189 lb 6 oz) 85.9 kg (189 lb 6 oz)     Body mass index is 27.97 kg/m².  No intake or output data in the 24 hours ending 24 1962  Diet: Regular/House; Fluid Consistency: Thin (IDDSI 0)  ----------------------------------------------------------------------------------------------------------------------  Physical exam:  Constitutional:  Well-developed and well-nourished, not in distress     HENT:  Head:  Normocephalic and atraumatic.  Mouth:  Moist mucous membranes.    Eyes:  Conjunctivae and EOM are normal.  Pupils are equal, round, and reactive to light.  No scleral icterus.    Neck:  Neck supple.  No JVD present.    Cardiovascular:  Normal rate, regular rhythm and normal heart sounds with no murmur.  Pulmonary/Chest:  No respiratory distress, no wheezes, no crackles, with normal breath sounds and good air movement.  Abdominal:  Soft.  Bowel sounds are normal.  No distension and no tenderness.  "  Musculoskeletal:  No edema, no tenderness, and no deformity.  No red or swollen joints anywhere.    Neurological:  Alert and oriented to person, place, and time.  No cranial nerve deficit.  No tongue deviation.  No facial droop.  No slurred speech.   Skin:  Skin is warm and dry. No rash noted. No pallor.   Peripheral vascular:  Strong pulses in all 4 extremities with no clubbing, no cyanosis, no edema.  Genitourinary: Deferred  ----------------------------------------------------------------------------------------------------------------------  Tele:    ----------------------------------------------------------------------------------------------------------------------  Results from last 7 days   Lab Units 08/21/24  1618   HSTROP T ng/L 14     Results from last 7 days   Lab Units 08/21/24  1618   WBC 10*3/mm3 6.27   HEMOGLOBIN g/dL 15.7   HEMATOCRIT % 47.7   MCV fL 95.6   MCHC g/dL 32.9   PLATELETS 10*3/mm3 194         Results from last 7 days   Lab Units 08/21/24  1618   SODIUM mmol/L 141   POTASSIUM mmol/L 3.8   MAGNESIUM mg/dL 2.3   CHLORIDE mmol/L 102   CO2 mmol/L 26.5   BUN mg/dL 19   CREATININE mg/dL 0.99   CALCIUM mg/dL 9.6   GLUCOSE mg/dL 92   ALBUMIN g/dL 4.1   BILIRUBIN mg/dL 0.5   ALK PHOS U/L 48   AST (SGOT) U/L 18   ALT (SGPT) U/L 20   Estimated Creatinine Clearance: 67.9 mL/min (by C-G formula based on SCr of 0.99 mg/dL).    No results found for: \"AMMONIA\"      No results found for: \"BLOODCX\"  No results found for: \"URINECX\"  No results found for: \"WOUNDCX\"  No results found for: \"STOOLCX\"    I have personally looked at the labs and they are summarized above.  ----------------------------------------------------------------------------------------------------------------------  Imaging Results (Last 24 Hours)       Procedure Component Value Units Date/Time    MRI Brain Without Contrast [[id number]] Collected: 08/21/24 1922     Updated: 08/21/24 1926    Narrative:      INDICATION: Seizure, vomiting " and vertigo.     COMPARISON: No relevant priors available     TECHNIQUE: Multiplanar, multisequence MRI of the brain without IV  contrast.     FINDINGS:  No restricted diffusion to suggest acute infarction.     Gray-white differentiation is relatively preserved. No mass, mass effect  or midline shift. Nonspecific T2/flair hyperintensities in the deep  white matter and periventricular white matter bilaterally. No evidence  of acute intracranial hemorrhage. Ventricles appear normal in size.  Basal cisterns are patent.      Soft tissues grossly unremarkable.       Impression:      1. No acute intracranial process.  2. Nonspecific T2/flair hyperintensities in the deep white matter and  periventricular white matter bilaterally. Findings likely related to  chronic ischemic white matter change. Follow-up as clinically relevant  if there is concern for other etiologies such as demyelinating process.        This report was finalized on 8/21/2024 7:24 PM by Alex Pallas, DO.       XR Chest 1 View [[id number]] Collected: 08/21/24 1633     Updated: 08/21/24 1635    Narrative:      PROCEDURE: XR CHEST 1 VW-       HISTORY: weakness     COMPARISON: None.     FINDINGS: The heart is normal in size. The mediastinum is unremarkable.  There are low lung volumes with a left basilar opacity likely  atelectasis. There is no pneumothorax. There are no acute osseous  abnormalities.       Impression:      Low lung volumes with a left basilar opacity likely  atelectasis.        This report was finalized on 8/21/2024 4:33 PM by Renny Zhu M.D..             ----------------------------------------------------------------------------------------------------------------------  Assessment and Plan:  79-year-old male who presented to the emergency room with intermittent episodes of dizziness associated with nausea and vomiting.  Was found to have bradycardia and subsequently admitted.    Paroxysmal atrial fibrillation-new  diagnosis.  Overnight patient EKG was noted to be atrial fibrillation with junctional rhythm.  On further questioning patient states he been told sometime ago that he has irregular heartbeat.  Cardiology on consult.  Follow-up with echocardiogram results.  Will avoid beta-blocker for now.  [id number] score is equal to 3.  Will require anticoagulation but will defer to cardiologist    Bradycardia.  Patient has been in sinus bradycardia as well as paroxysmal atrial fibrillation.  Continue to monitor.  Follow-up with cardiology recommendation    Dizziness/vertigo.  MRI ruled out acute CVA.  Suspect this may be due to his bradycardia.  Currently asymptomatic.  Will continue to monitor  Supportive care    Elevated blood pressure with no diagnosis of hypertension.  Patient blood pressure has been elevated since admission.  Patient reported that occasionally his blood pressure is high in his PCPs office but was not placed on any medication.  Start patient on losartan.    Nausea/vomiting.  This seems to have resolved.  Continue with supportive care.    Prophylaxis:  DVT-SCDs    Disposition:  Change to inpatient status.  Anticipate patient will be discharged in the next 1 to 2 days.  I discussed the plan of care with the patient and nursing staff.    Michele Sandoval MD  08/22/24  13:43 EDT    Dragon disclaimer:  Part of this note may be an electronic transcription/translation of spoken language to printed text using the Dragon Dictation System.

## 2024-08-22 NOTE — PLAN OF CARE
Goal Outcome Evaluation:  Patient discharged home today. IV and telemetry D/C'd. Patient A&Ox4. Patient is currently on room air. Patient has tolerated all interventions. No complaints or concerns at this time. No signs of acute distress noted. Will continue to follow plan of care.

## 2024-08-22 NOTE — PLAN OF CARE
Pt. Has had a non-eventful night. Pt. Has no c/o att. Pt. Shows no signs of acute distress att. Pt. VSS att. Plan of care ongoing.

## 2024-08-22 NOTE — CASE MANAGEMENT/SOCIAL WORK
Discharge Planning Assessment   [place]     Patient Name: Meg Trejo  MRN: [id number]  Today's Date: 8/22/2024    Admit Date: 8/21/2024    Plan: SS received consult per nsg for discharge planning.  SS noted ED Case Management completed initial referral.  Pt resides at home with spouse, Luzma, and plans to return home at discharge.  Pt currently does not utilize home health or DME.  Pt stated no needs.  SS will sign off at this time.       Discharge Plan       Row Name 08/22/24 1652       Plan    Final Discharge Disposition Code 01 - home or self-care    Final Note Pt to be discharged home on this date.      Row Name 08/22/24 5836       Plan    Plan SS received consult per ns for discharge planning.  SS noted ED Case Management completed initial referral.  Pt resides at home with spouse, Luzma, and plans to return home at discharge.  Pt currently does not utilize home health or DME.  Pt stated no needs.  SS will sign off at this time.                  Continued Care and Services - Admitted Since 8/21/2024    No active coordination exists for this encounter.       Expected Discharge Date and Time       Expected Discharge Date Expected Discharge Time    Aug 22, 2024           Mahsa Escobar, JOSE ANTONIOW

## 2024-08-22 NOTE — CASE MANAGEMENT/SOCIAL WORK
Discharge Planning Assessment   [place]     Patient Name: Meg Trejo  MRN: [id number]  Today's Date: 8/22/2024    Admit Date: 8/21/2024    Plan: SS received consult per nsg for discharge planning.  SS noted ED Case Management completed initial referral.  Pt resides at home with spouse, Luzma, and plans to return home at discharge.  Pt currently does not utilize home health or DME.  Pt stated no needs.  SS will sign off at this time.       Discharge Plan       Row Name 08/22/24 1454       Plan    Plan SS received consult per ns for discharge planning.  SS noted ED Case Management completed initial referral.  Pt resides at home with spouse, Luzma, and plans to return home at discharge.  Pt currently does not utilize home health or DME.  Pt stated no needs.  SS will sign off at this time.                  Continued Care and Services - Admitted Since 8/21/2024    No active coordination exists for this encounter.       Expected Discharge Date and Time       Expected Discharge Date Expected Discharge Time    Aug 22, 2024             Mahsa Escobar, JOSE ANTONIOW

## 2024-08-22 NOTE — CONSULTS
Consults  Date of Admit: 8/21/2024  Date of Consult: 08/22/24  No ref. provider found  Meg Trejo  1945    Consulting Physician: Molina Tapia MD    Cardiology consultation    Reason for consultation: Bradycardia      Dizziness  Associated symptoms include nausea and vomiting. Pertinent negatives include no chest pain.       Subjective     Chief Complaint   Patient presents with    Slow Heart Rate    Dizziness       Meg Trejo is a 79 y.o. male with no significant past medical history.    He reports that approximately 3 days ago he began to have dizziness and severe nausea with vomiting.  It seemed to wax and wane.  He felt as though he was improving, but when he went to his primary care provider's office they told him his heart rate was low and he needed to come to the emergency room.    Triage vital signs on admission revealed a heart rate of 52.  EKG showed sinus bradycardia at 49 bpm with PACs.  A second EKG was electronically read as atrial fibrillation but actual interpretation revealed sinus bradycardia with junctional escape beats.    Admission labs and studies significant for CBC, CMP, TSH, HST, and proBNP perfectly within normal limits.    He reports that he feels fine and very much would like to go home.        Cardiac risk factors:Negative    Last Echo:  Results for orders placed during the hospital encounter of 03/28/18    Adult Transthoracic Echo Complete W/ Cont if Necessary Per Protocol    Interpretation Summary  · The study is technically adequate for diagnosis.  · Left ventricular systolic function is normal. Estimated EF = 65%.  · There are no significant valvular abnormalities noted.  · There is no evidence of pericardial effusion.      Last Stress:       Last Cath:      Past Medical History:   Diagnosis Date    Fracture, foot      Past Surgical History:   Procedure Laterality Date    ANKLE SURGERY      WRIST SURGERY       Family History   Problem Relation Age of Onset    Heart  disease Mother     Cancer Father      Social History     Tobacco Use    Smoking status: Former     Current packs/day: 0.00     Types: Cigarettes     Quit date: 3/21/1988     Years since quittin.4    Smokeless tobacco: Never   Vaping Use    Vaping status: Never Used   Substance Use Topics    Alcohol use: Yes    Drug use: No     Medications Prior to Admission   Medication Sig Dispense Refill Last Dose    Ibuprofen-diphenhydrAMINE Cit (ADVIL PM PO) Take 1 tablet by mouth Every Night.   2024    Testosterone Cypionate 200 MG/ML kit Inject 1 mL into the appropriate muscle as directed by prescriber Every 14 (Fourteen) Days.   8/15/2024     Allergies:  Patient has no known allergies.    Review of Systems   Constitutional:  Positive for appetite change.   Respiratory:  Negative for shortness of breath.    Cardiovascular:  Negative for chest pain, palpitations and leg swelling.   Gastrointestinal:  Positive for nausea and vomiting.   Neurological:  Positive for dizziness.          Objective      Vital Signs  Temp:  [97.8 °F (36.6 °C)-98.5 °F (36.9 °C)] 97.8 °F (36.6 °C)  Heart Rate:  [52-65] 59  Resp:  [18] 18  BP: (148-178)/(77-93) 160/91  Body mass index is 27.97 kg/m².  No intake or output data in the 24 hours ending 24 1532    Physical Exam  Vitals and nursing note reviewed. Exam conducted with a chaperone present (Wife at bedside).   Constitutional:       General: He is not in acute distress.     Appearance: He is obese.   HENT:      Head: Normocephalic and atraumatic.      Nose: Nose normal.   Eyes:      Conjunctiva/sclera: Conjunctivae normal.   Neck:      Vascular: No carotid bruit.   Cardiovascular:      Rate and Rhythm: Normal rate and regular rhythm.      Heart sounds: No murmur heard.  Pulmonary:      Effort: Pulmonary effort is normal.      Breath sounds: Normal breath sounds.   Musculoskeletal:         General: Normal range of motion.      Cervical back: Normal range of motion.      Right lower  "leg: No edema.      Left lower leg: No edema.   Skin:     General: Skin is warm and dry.   Neurological:      General: No focal deficit present.      Mental Status: He is alert.   Psychiatric:         Mood and Affect: Mood normal.         Behavior: Behavior normal.         Telemetry:  Sinus 60s      2.5 second pause    Results Review:   I reviewed the patient's new clinical results.  Results from last 7 days   Lab Units 08/21/24  1618   HSTROP T ng/L 14     Results from last 7 days   Lab Units 08/21/24  1618   WBC 10*3/mm3 6.27   HEMOGLOBIN g/dL 15.7   PLATELETS 10*3/mm3 194     Results from last 7 days   Lab Units 08/21/24  1618   SODIUM mmol/L 141   POTASSIUM mmol/L 3.8   CHLORIDE mmol/L 102   CO2 mmol/L 26.5   BUN mg/dL 19   CREATININE mg/dL 0.99   CALCIUM mg/dL 9.6   GLUCOSE mg/dL 92   ALT (SGPT) U/L 20   AST (SGOT) U/L 18     No results found for: \"INR\"  Lab Results   Component Value Date    MG 2.3 08/21/2024     Lab Results   Component Value Date    TSH 1.640 08/21/2024      Lab Results   Component Value Date    PROBNP 476.6 08/21/2024        EKG:             Imaging Results (Last 72 Hours)       Procedure Component Value Units Date/Time    MRI Brain Without Contrast [[id number]] Collected: 08/21/24 1922     Updated: 08/21/24 1926    Narrative:      INDICATION: Seizure, vomiting and vertigo.     COMPARISON: No relevant priors available     TECHNIQUE: Multiplanar, multisequence MRI of the brain without IV  contrast.     FINDINGS:  No restricted diffusion to suggest acute infarction.     Gray-white differentiation is relatively preserved. No mass, mass effect  or midline shift. Nonspecific T2/flair hyperintensities in the deep  white matter and periventricular white matter bilaterally. No evidence  of acute intracranial hemorrhage. Ventricles appear normal in size.  Basal cisterns are patent.      Soft tissues grossly unremarkable.       Impression:      1. No acute intracranial process.  2. Nonspecific T2/flair " hyperintensities in the deep white matter and  periventricular white matter bilaterally. Findings likely related to  chronic ischemic white matter change. Follow-up as clinically relevant  if there is concern for other etiologies such as demyelinating process.        This report was finalized on 8/21/2024 7:24 PM by Alex Pallas, DO.       XR Chest 1 View [[id number]] Collected: 08/21/24 1633     Updated: 08/21/24 1635    Narrative:      PROCEDURE: XR CHEST 1 VW-       HISTORY: weakness     COMPARISON: None.     FINDINGS: The heart is normal in size. The mediastinum is unremarkable.  There are low lung volumes with a left basilar opacity likely  atelectasis. There is no pneumothorax. There are no acute osseous  abnormalities.       Impression:      Low lung volumes with a left basilar opacity likely  atelectasis.        This report was finalized on 8/21/2024 4:33 PM by Renny Zhu M.D..                Assessment:  1.  Vertigo  2.  Bradycardia  3.  Nausea/vomiting        Plan:  It is difficult to interpret which symptom caused which finding.  It appears that he has been experiencing vertigo associated with nausea and vomiting which likely caused him to vagal into bradycardic heart rate.  Would recommend event monitor at discharge.    Patient seen and evaluated with Dr. Tapia and plan of care reflects his recommendations.    Thank you very much for asking us to be involved in this patient's care.  We will follow along with you.      Electronically signed by CHESTER Lam, 08/22/24, 3:57 PM EDT.

## 2024-08-22 NOTE — DISCHARGE SUMMARY
[facility] HOSPITALIST MEDICINE DISCHARGE SUMMARY    Patient Identification:  Name:  Meg Trejo  Age:  79 y.o.  Sex:  male  :  1945  MRN:  [id number]  Visit Number:  [id number]    Date of Admission: 2024  Date of Discharge:  2024     PCP: Thelma Yoo APRN    DISCHARGE DIAGNOSIS  New onset atrial fibrillation.  Sinus bradycardia  Dizziness/vertigo  Hypertension  Intractable nausea and vomiting    CONSULTS   Dr.Yasir Tapia        Cardiology    PROCEDURES PERFORMED  None    HOSPITAL COURSE  Patient is a 79 y.o. male presented to [facility] complaining of dizziness/vertigo associated with nausea and vomiting.  Patient does not have any documented past medical history.  He presents with feeling dizzy, lightheaded followed by nausea and vomiting occurred on 2 consecutive days.  On the third day he went to see a PCP and was noted to have bradycardia.  He presented to the emergency room workup was negative.  During the night however he had an EKG that was interpreted as atrial fibrillation with junctional rhythm.  On further questioning patient said he was htold in the past that he has irregular heartbeat.  Looking at his old records what he had was bradycardia with frequent PACs.  His blood pressure was also elevated.  He was started on losartan.  He reported having borderline blood pressure during his PCPs visits.  He was not started on any blood pressure medication.  Since his nausea and vomiting was persistent and associated with dizziness, MRI of the brain was done to rule out posterior circulation infarct.  His MRI was reported as normal.  Patient in the emergency room received atropine and his heart rate increased to more than 60.  Cardiology was consulted.  At this time patient is stable cardiologist recommending outpatient Holter monitor.  Patient has tolerated breakfast no nausea or vomiting he will be discharged to follow-up with cardiology..      VITAL  SIGNS:      08/21/24  1552 08/21/24  1944 08/22/24  0530   Weight: 81.2 kg (179 lb) 85.9 kg (189 lb 6 oz) 85.9 kg (189 lb 6 oz)     Body mass index is 27.97 kg/m².    PHYSICAL EXAM:  Constitutional:  Well-developed and well-nourished.  No respiratory distress.      HENT:  Head: Normocephalic and atraumatic.  Mouth:  Moist mucous membranes.    Eyes:  Conjunctivae and EOM are normal.  Pupils are equal, round, and reactive to light.  No scleral icterus.  Neck:  Neck supple.  No JVD present.    Cardiovascular: Bradycardia, regular rhythm and normal heart sounds with no murmur.  Pulmonary/Chest:  No respiratory distress, no wheezes, no crackles, with normal breath sounds and good air movement.  Abdominal:  Soft.  Bowel sounds are normal.  No distension and no tenderness.   Musculoskeletal:  No edema, no tenderness, and no deformity.  No red or swollen joints anywhere.    Neurological:  Alert and oriented to person, place, and time.  No cranial nerve deficit.  No tongue deviation.  No facial droop.  No slurred speech.   Skin:  Skin is warm and dry.  No rash noted.  No pallor.   Psychiatric:  Normal mood and affect.  Behavior is normal.  Judgment and thought content normal.   Peripheral vascular:  No edema and strong pulses on all 4 extremities.  Genitourinary: Deferred    Lab Results (last 72 hours)       Procedure Component Value Units Date/Time    TSH [[id number]]  (Normal) Collected: 08/21/24 1618    Specimen: Blood from Arm, Left Updated: 08/21/24 1823     TSH 1.640 uIU/mL     Comprehensive Metabolic Panel [[id number]] Collected: 08/21/24 1618    Specimen: Blood from Arm, Left Updated: 08/21/24 1647     Glucose 92 mg/dL      BUN 19 mg/dL      Creatinine 0.99 mg/dL      Sodium 141 mmol/L      Potassium 3.8 mmol/L      Comment: Slight hemolysis detected by analyzer. Result may be falsely elevated.        Chloride 102 mmol/L      CO2 26.5 mmol/L      Calcium 9.6 mg/dL      Total Protein 7.5 g/dL      Albumin 4.1 g/dL       ALT (SGPT) 20 U/L      AST (SGOT) 18 U/L      Alkaline Phosphatase 48 U/L      Total Bilirubin 0.5 mg/dL      Globulin 3.4 gm/dL      A/G Ratio 1.2 g/dL      BUN/Creatinine Ratio 19.2     Anion Gap 12.5 mmol/L      eGFR 78.5 mL/min/1.73     Narrative:      GFR Normal >60  Chronic Kidney Disease <60  Kidney Failure <15    The GFR formula is only valid for adults with stable renal function between ages 18 and 70.    Single High Sensitivity Troponin T [[id number]]  (Normal) Collected: 08/21/24 1618    Specimen: Blood from Arm, Left Updated: 08/21/24 1647     HS Troponin T 14 ng/L     Narrative:      High Sensitive Troponin T Reference Range:  <14.0 ng/L- Negative Female for AMI  <22.0 ng/L- Negative Male for AMI  >=14 - Abnormal Female indicating possible myocardial injury.  >=22 - Abnormal Male indicating possible myocardial injury.   Clinicians would have to utilize clinical acumen, EKG, Troponin, and serial changes to determine if it is an Acute Myocardial Infarction or myocardial injury due to an underlying chronic condition.         Magnesium [[id number]]  (Normal) Collected: 08/21/24 1618    Specimen: Blood from Arm, Left Updated: 08/21/24 1647     Magnesium 2.3 mg/dL     BNP [[id number]]  (Normal) Collected: 08/21/24 1618    Specimen: Blood from Arm, Left Updated: 08/21/24 1646     proBNP 476.6 pg/mL     Narrative:      This assay is used as an aid in the diagnosis of individuals suspected of having heart failure. It can be used as an aid in the diagnosis of acute decompensated heart failure (ADHF) in patients presenting with signs and symptoms of ADHF to the emergency department (ED). In addition, NT-proBNP of <300 pg/mL indicates ADHF is not likely.    Age Range Result Interpretation  NT-proBNP Concentration (pg/mL:      <50             Positive            >450                   Gray                 300-450                    Negative             <300    50-75           Positive            >900                   Brice                300-900                  Negative            <300      >75             Positive            >1800                  Gray                300-1800                  Negative            <300    CBC & Differential [[id number]]  (Normal) Collected: 08/21/24 1618    Specimen: Blood from Arm, Left Updated: 08/21/24 1623    Narrative:      The following orders were created for panel order CBC & Differential.  Procedure                               Abnormality         Status                     ---------                               -----------         ------                     CBC Auto Differential[[id number]]        Normal              Final result                 Please view results for these tests on the individual orders.    CBC Auto Differential [[id number]]  (Normal) Collected: 08/21/24 1618    Specimen: Blood from Arm, Left Updated: 08/21/24 1623     WBC 6.27 10*3/mm3      RBC 4.99 10*6/mm3      Hemoglobin 15.7 g/dL      Hematocrit 47.7 %      MCV 95.6 fL      MCH 31.5 pg      MCHC 32.9 g/dL      RDW 12.3 %      RDW-SD 43.2 fl      MPV 9.2 fL      Platelets 194 10*3/mm3      Neutrophil % 61.3 %      Lymphocyte % 30.8 %      Monocyte % 6.2 %      Eosinophil % 1.1 %      Basophil % 0.3 %      Immature Grans % 0.3 %      Neutrophils, Absolute 3.84 10*3/mm3      Lymphocytes, Absolute 1.93 10*3/mm3      Monocytes, Absolute 0.39 10*3/mm3      Eosinophils, Absolute 0.07 10*3/mm3      Basophils, Absolute 0.02 10*3/mm3      Immature Grans, Absolute 0.02 10*3/mm3      nRBC 0.0 /100 WBC           Imaging Results (All)       Procedure Component Value Units Date/Time    MRI Brain Without Contrast [[id number]] Collected: 08/21/24 1922     Updated: 08/21/24 1926    Narrative:      INDICATION: Seizure, vomiting and vertigo.     COMPARISON: No relevant priors available     TECHNIQUE: Multiplanar, multisequence MRI of the brain without IV  contrast.     FINDINGS:  No restricted diffusion to suggest acute infarction.      Gray-white differentiation is relatively preserved. No mass, mass effect  or midline shift. Nonspecific T2/flair hyperintensities in the deep  white matter and periventricular white matter bilaterally. No evidence  of acute intracranial hemorrhage. Ventricles appear normal in size.  Basal cisterns are patent.      Soft tissues grossly unremarkable.       Impression:      1. No acute intracranial process.  2. Nonspecific T2/flair hyperintensities in the deep white matter and  periventricular white matter bilaterally. Findings likely related to  chronic ischemic white matter change. Follow-up as clinically relevant  if there is concern for other etiologies such as demyelinating process.        This report was finalized on 8/21/2024 7:24 PM by Alex Pallas, DO.       XR Chest 1 View [[id number]] Collected: 08/21/24 1633     Updated: 08/21/24 1635    Narrative:      PROCEDURE: XR CHEST 1 VW-       HISTORY: weakness     COMPARISON: None.     FINDINGS: The heart is normal in size. The mediastinum is unremarkable.  There are low lung volumes with a left basilar opacity likely  atelectasis. There is no pneumothorax. There are no acute osseous  abnormalities.       Impression:      Low lung volumes with a left basilar opacity likely  atelectasis.        This report was finalized on 8/21/2024 4:33 PM by Renny Zhu M.D..             Results for orders placed during the hospital encounter of 03/28/18    Adult Transthoracic Echo Complete W/ Cont if Necessary Per Protocol    Interpretation Summary  · The study is technically adequate for diagnosis.  · Left ventricular systolic function is normal. Estimated EF = 65%.  · There are no significant valvular abnormalities noted.  · There is no evidence of pericardial effusion.          DISCHARGE DISPOSITION   Condition of patient at time of discharge is stable    DISCHARGE MEDICATIONS:     Discharge Medications        New Medications        Instructions Start Date   losartan  25 MG tablet  Commonly known as: COZAAR   25 mg, Oral, Every 24 Hours Scheduled   Start Date: August 23, 2024     melatonin 5 MG tablet tablet   5 mg, Oral, Nightly PRN             Continue These Medications        Instructions Start Date   ADVIL PM PO   1 tablet, Oral, Nightly      Testosterone Cypionate 200 MG/ML kit   1 mL, Intramuscular, Every 14 Days               Diet Instructions       Diet: Cardiac Diets; Healthy Heart (2-3 Na+); Regular (IDDSI 7); Thin (IDDSI 0)      Discharge Diet: Cardiac Diets    Cardiac Diet: Healthy Heart (2-3 Na+)    Texture: Regular (IDDSI 7)    Fluid Consistency: Thin (IDDSI 0)            Activity Instructions       Activity as Tolerated            Additional Instructions for the Follow-ups that You Need to Schedule       Discharge Follow-up with Specialty: cardiology   As directed      Specialty: cardiology   Follow Up Details: For Holter monitor       Additional information on Labs and Follow-ups:    Office closed will call patient tomorrow with víctor.          Follow-up Information       Thelma Yoo APRN .    Specialties: Nurse Practitioner, Family Medicine  Why: pt has a follow up víctor with esthela connell on sept. 4th @10:15  Contact information:  [address] [place] [id number]  [phone]                             TEST  RESULTS PENDING AT DISCHARGE       Michele Sandoval MD  08/22/24  16:41 EDT    Please note that this discharge summary required more than 30 minutes to complete.    Please send a copy of this dictation to the following providers:  Thelma Yoo APRN Dragon disclaimer:  Part of this note may be an electronic transcription/translation of spoken language to printed text using the Dragon Dictation System.

## 2024-08-23 LAB
QT INTERVAL: 420 MS
QT INTERVAL: 442 MS
QT INTERVAL: 458 MS
QTC INTERVAL: 411 MS
QTC INTERVAL: 412 MS
QTC INTERVAL: 413 MS

## 2024-08-26 LAB
BH CV ECHO MEAS - ACS: 1.5 CM
BH CV ECHO MEAS - AI P1/2T: 635.9 MSEC
BH CV ECHO MEAS - AO MAX PG: 11.8 MMHG
BH CV ECHO MEAS - AO MEAN PG: 6 MMHG
BH CV ECHO MEAS - AO ROOT DIAM: 3.4 CM
BH CV ECHO MEAS - AO V2 MAX: 172 CM/SEC
BH CV ECHO MEAS - AO V2 VTI: 36 CM
BH CV ECHO MEAS - AVA(I,D): 2.26 CM2
BH CV ECHO MEAS - EDV(CUBED): 119.8 ML
BH CV ECHO MEAS - EDV(MOD-SP4): 94.9 ML
BH CV ECHO MEAS - EF(MOD-BP): 71 %
BH CV ECHO MEAS - EF(MOD-SP4): 71.8 %
BH CV ECHO MEAS - ESV(CUBED): 28.5 ML
BH CV ECHO MEAS - ESV(MOD-SP4): 26.8 ML
BH CV ECHO MEAS - FS: 38 %
BH CV ECHO MEAS - IVS/LVPW: 0.91 CM
BH CV ECHO MEAS - IVSD: 1.1 CM
BH CV ECHO MEAS - LA DIMENSION: 4.2 CM
BH CV ECHO MEAS - LAT PEAK E' VEL: 10.2 CM/SEC
BH CV ECHO MEAS - LV DIASTOLIC VOL/BSA (35-75): 47.1 CM2
BH CV ECHO MEAS - LV MASS(C)D: 215.7 GRAMS
BH CV ECHO MEAS - LV MAX PG: 5.3 MMHG
BH CV ECHO MEAS - LV MEAN PG: 2 MMHG
BH CV ECHO MEAS - LV SYSTOLIC VOL/BSA (12-30): 13.3 CM2
BH CV ECHO MEAS - LV V1 MAX: 115 CM/SEC
BH CV ECHO MEAS - LV V1 VTI: 25.9 CM
BH CV ECHO MEAS - LVIDD: 4.9 CM
BH CV ECHO MEAS - LVIDS: 3.1 CM
BH CV ECHO MEAS - LVOT AREA: 3.1 CM2
BH CV ECHO MEAS - LVOT DIAM: 2 CM
BH CV ECHO MEAS - LVPWD: 1.21 CM
BH CV ECHO MEAS - MED PEAK E' VEL: 6.7 CM/SEC
BH CV ECHO MEAS - MV A MAX VEL: 62.1 CM/SEC
BH CV ECHO MEAS - MV E MAX VEL: 113 CM/SEC
BH CV ECHO MEAS - MV E/A: 1.82
BH CV ECHO MEAS - PA ACC TIME: 0.09 SEC
BH CV ECHO MEAS - RAP SYSTOLE: 10 MMHG
BH CV ECHO MEAS - RVSP: 51.2 MMHG
BH CV ECHO MEAS - SV(LVOT): 81.4 ML
BH CV ECHO MEAS - SV(MOD-SP4): 68.1 ML
BH CV ECHO MEAS - SVI(LVOT): 40.3 ML/M2
BH CV ECHO MEAS - SVI(MOD-SP4): 33.8 ML/M2
BH CV ECHO MEAS - TAPSE (>1.6): 2.12 CM
BH CV ECHO MEAS - TR MAX PG: 41.2 MMHG
BH CV ECHO MEAS - TR MAX VEL: 321 CM/SEC
BH CV ECHO MEASUREMENTS AVERAGE E/E' RATIO: 13.37
LEFT ATRIUM VOLUME INDEX: 47.6 ML/M2

## 2024-09-16 ENCOUNTER — OFFICE VISIT (OUTPATIENT)
Dept: CARDIOLOGY | Facility: CLINIC | Age: 79
End: 2024-09-16
Payer: MEDICARE

## 2024-09-16 VITALS
OXYGEN SATURATION: 97 % | DIASTOLIC BLOOD PRESSURE: 82 MMHG | RESPIRATION RATE: 16 BRPM | SYSTOLIC BLOOD PRESSURE: 138 MMHG | BODY MASS INDEX: 27.05 KG/M2 | HEART RATE: 65 BPM | HEIGHT: 69 IN | WEIGHT: 182.6 LBS

## 2024-09-16 DIAGNOSIS — R00.1 BRADYCARDIA: ICD-10-CM

## 2024-09-16 DIAGNOSIS — I10 ESSENTIAL HYPERTENSION: Primary | ICD-10-CM

## 2024-09-16 PROBLEM — I48.91 NEW ONSET ATRIAL FIBRILLATION: Status: RESOLVED | Noted: 2024-08-22 | Resolved: 2024-09-16

## 2024-09-16 PROBLEM — I48.0 PAF (PAROXYSMAL ATRIAL FIBRILLATION): Status: RESOLVED | Noted: 2024-08-22 | Resolved: 2024-09-16

## 2024-09-16 LAB
QT INTERVAL: 420 MS
QTC INTERVAL: 412 MS

## 2024-09-16 PROCEDURE — 3079F DIAST BP 80-89 MM HG: CPT | Performed by: PHYSICIAN ASSISTANT

## 2024-09-16 PROCEDURE — 3075F SYST BP GE 130 - 139MM HG: CPT | Performed by: PHYSICIAN ASSISTANT

## 2024-09-16 PROCEDURE — 99214 OFFICE O/P EST MOD 30 MIN: CPT | Performed by: PHYSICIAN ASSISTANT

## 2024-09-16 PROCEDURE — 93000 ELECTROCARDIOGRAM COMPLETE: CPT | Performed by: PHYSICIAN ASSISTANT

## 2024-12-16 ENCOUNTER — OFFICE VISIT (OUTPATIENT)
Dept: CARDIOLOGY | Facility: CLINIC | Age: 79
End: 2024-12-16
Payer: MEDICARE

## 2024-12-16 VITALS
WEIGHT: 187.8 LBS | HEIGHT: 69 IN | OXYGEN SATURATION: 94 % | DIASTOLIC BLOOD PRESSURE: 92 MMHG | SYSTOLIC BLOOD PRESSURE: 161 MMHG | BODY MASS INDEX: 27.81 KG/M2 | HEART RATE: 62 BPM

## 2024-12-16 DIAGNOSIS — I10 ESSENTIAL HYPERTENSION: Primary | ICD-10-CM

## 2024-12-16 DIAGNOSIS — I49.1 PAC (PREMATURE ATRIAL CONTRACTION): ICD-10-CM

## 2024-12-16 RX ORDER — LOSARTAN POTASSIUM 25 MG/1
25 TABLET ORAL
Qty: 90 TABLET | Refills: 2 | Status: SHIPPED | OUTPATIENT
Start: 2024-12-16 | End: 2025-01-15

## 2024-12-16 RX ORDER — SILDENAFIL 50 MG/1
50 TABLET, FILM COATED ORAL AS NEEDED
COMMUNITY
Start: 2024-12-11

## 2024-12-16 NOTE — PROGRESS NOTES
Thelma Yoo APRN  Meg Trejo  2024    Patient Active Problem List   Diagnosis    Right ankle pain    PAC (premature atrial contraction)    Leg swelling    Bradycardia    Essential hypertension    Dizziness    Nausea and vomiting       Dear Thelma Yoo APRN:    Subjective     History of Present Illness:    Chief Complaint   Patient presents with    Follow-up     routine       Meg Trejo is a pleasant 79 y.o. male with a past medical history significant for essential hypertension and bradycardia. He comes in today for cardiology follow-up.     Barrera comes in today reporting he has been doing well and has no complaints.  He denies any chest pains, shortness of breath, dizziness, or syncope.    No Known Allergies:      Current Outpatient Medications:     Ibuprofen-diphenhydrAMINE Cit (ADVIL PM PO), Take 1 tablet by mouth Every Night., Disp: , Rfl:     losartan (COZAAR) 25 MG tablet, Take 1 tablet by mouth Daily for 30 days., Disp: 90 tablet, Rfl: 2    Multiple Vitamins-Minerals (ICAPS AREDS 2 PO), Take  by mouth., Disp: , Rfl:     sildenafil (VIAGRA) 50 MG tablet, Take 1 tablet by mouth As Needed., Disp: , Rfl:     Testosterone Cypionate 200 MG/ML kit, Inject 1 mL into the appropriate muscle as directed by prescriber Every 14 (Fourteen) Days., Disp: , Rfl:     The following portions of the patient's history were reviewed and updated as appropriate: allergies, current medications, past family history, past medical history, past social history, past surgical history and problem list.    Social History     Tobacco Use    Smoking status: Former     Current packs/day: 0.00     Types: Cigarettes     Quit date: 3/21/1988     Years since quittin.7    Smokeless tobacco: Never   Vaping Use    Vaping status: Never Used   Substance Use Topics    Alcohol use: Yes     Comment: social    Drug use: No         Objective   Vitals:    24 1055   BP: 161/92   Pulse: 62   SpO2: 94%   Weight: 85.2  "kg (187 lb 12.8 oz)   Height: 175.3 cm (69\")     Body mass index is 27.73 kg/m².    ROS    Constitutional:       General: Not in acute distress.     Appearance: Healthy appearance. Well-developed and not in distress. Not diaphoretic.   Eyes:      Conjunctiva/sclera: Conjunctivae normal.      Pupils: Pupils are equal, round, and reactive to light.   HENT:      Head: Normocephalic and atraumatic.   Neck:      Vascular: No carotid bruit or JVD.   Pulmonary:      Effort: Pulmonary effort is normal. No respiratory distress.      Breath sounds: Normal breath sounds.   Cardiovascular:      Normal rate. Regular rhythm.   Edema:     Peripheral edema absent.   Skin:     General: Skin is cool.   Neurological:      Mental Status: Alert, oriented to person, place, and time and oriented to person, place and time.         Lab Results   Component Value Date     08/21/2024    K 3.8 08/21/2024     08/21/2024    CO2 26.5 08/21/2024    BUN 19 08/21/2024    CREATININE 0.99 08/21/2024    GLUCOSE 92 08/21/2024    CALCIUM 9.6 08/21/2024    AST 18 08/21/2024    ALT 20 08/21/2024    ALKPHOS 48 08/21/2024     No results found for: \"CKTOTAL\"  Lab Results   Component Value Date    WBC 6.27 08/21/2024    HGB 15.7 08/21/2024    HCT 47.7 08/21/2024     08/21/2024     No results found for: \"INR\"  Lab Results   Component Value Date    MG 2.3 08/21/2024     Lab Results   Component Value Date    TSH 1.640 08/21/2024      No results found for: \"BNP\"    During this visit the following were done:  Labs Reviewed []    Labs Ordered []    Radiology Reports Reviewed []    Radiology Ordered []    PCP Records Reviewed []    Referring Provider Records Reviewed []    ER Records Reviewed []    Hospital Records Reviewed []    History Obtained From Family []    Radiology Images Reviewed []    Other Reviewed []    Records Requested []       Procedures    Assessment & Plan    Diagnosis Plan   1. Essential hypertension  Basic Metabolic Panel      2. " PAC (premature atrial contraction)                 Recommendations:  Essential hypertension  Elevated today he reports he was on losartan but had run out of losartan a few months ago.  Will refill this.  Will recheck BMP in 2 weeks  Recommend BP goal of at least less than 140 mmHg systolic  Syncope  No further recurrence    No follow-ups on file.    As always, I appreciate very much the opportunity to participate in the cardiovascular care of your patients.      With Best Regards,    Ac Adan PA-C

## 2024-12-17 ENCOUNTER — TELEPHONE (OUTPATIENT)
Dept: CARDIOLOGY | Facility: CLINIC | Age: 79
End: 2024-12-17
Payer: MEDICARE

## 2024-12-17 NOTE — TELEPHONE ENCOUNTER
Contacted PCP-they were unable to locate it while on the phone-they will keep looking and send if if they find it

## 2024-12-17 NOTE — TELEPHONE ENCOUNTER
Patient wore a heart monitor at the request of hospital d/c.  We did not put this on and neither did interventional that recommended it per hospital note.  We will contact PCP to see if they have it

## 2024-12-20 NOTE — TELEPHONE ENCOUNTER
He is asymptomatic so I am fine with him not wearing it right now.  So his PCP is who ordered this and had it put on?  Or was it our office question

## 2024-12-20 NOTE — TELEPHONE ENCOUNTER
Our rep was unable to find the report.   Called pt and advised him. I offered him to come in to get another one to wear it again. He stated he does not want to wear another one.

## 2025-03-13 ENCOUNTER — TELEPHONE (OUTPATIENT)
Dept: CARDIOLOGY | Facility: CLINIC | Age: 80
End: 2025-03-13
Payer: MEDICARE

## 2025-03-14 ENCOUNTER — TELEPHONE (OUTPATIENT)
Dept: CARDIOLOGY | Facility: CLINIC | Age: 80
End: 2025-03-14
Payer: MEDICARE

## 2025-03-14 NOTE — TELEPHONE ENCOUNTER
"Name: Meg Trejo GIOVANNA \"Barrera\"      Relationship: Self      Best Callback Number: [phone]      HUB PROVIDED THE RELAY MESSAGE FROM THE OFFICE      PATIENT: VOICED UNDERSTANDING AND HAS NO FURTHER QUESTIONS AT THIS TIME    ADDITIONAL INFORMATION: PATIENT WILL GO [facility] TO GET THEM DONE TODAY.     "

## 2025-03-14 NOTE — TELEPHONE ENCOUNTER
"  Caller: Meg Trejo \"Barrera\"    Relationship: Self    Best call back number: [phone]    What is the best time to reach you: ANY    Who are you requesting to speak with (clinical staff, provider,  specific staff member): CLINICAL    Do you know the name of the person who called: JOHN    What was the call regarding: PATIENT CALLED BACK REGARDING THE LABS THAT HE WAS REQUESTED TO COMPLETE BEFORE HIS APPOINTMENT ON MONDAY 3.17.25. PATIENT STATES HE JUST HAD THEM DONE AT [facility] 2-3 WEEKS AGO. PLEASE REQUEST THOSE LAB RESULTS AND CALL PATIENT WITH ANY QUESTIONS, OR IF HE STILL NEEDS TO COME IN. THANK YOU    Is it okay if the provider responds through [employer]: PLEASE CALL      "

## 2025-03-17 ENCOUNTER — OFFICE VISIT (OUTPATIENT)
Dept: CARDIOLOGY | Facility: CLINIC | Age: 80
End: 2025-03-17
Payer: MEDICARE

## 2025-03-17 VITALS
BODY MASS INDEX: 27.73 KG/M2 | HEART RATE: 72 BPM | OXYGEN SATURATION: 98 % | HEIGHT: 69 IN | DIASTOLIC BLOOD PRESSURE: 88 MMHG | SYSTOLIC BLOOD PRESSURE: 145 MMHG

## 2025-03-17 DIAGNOSIS — I10 ESSENTIAL HYPERTENSION: Primary | ICD-10-CM

## 2025-03-17 DIAGNOSIS — I49.1 PAC (PREMATURE ATRIAL CONTRACTION): ICD-10-CM

## 2025-03-17 PROCEDURE — 3077F SYST BP >= 140 MM HG: CPT | Performed by: PHYSICIAN ASSISTANT

## 2025-03-17 PROCEDURE — G2211 COMPLEX E/M VISIT ADD ON: HCPCS | Performed by: PHYSICIAN ASSISTANT

## 2025-03-17 PROCEDURE — 3079F DIAST BP 80-89 MM HG: CPT | Performed by: PHYSICIAN ASSISTANT

## 2025-03-17 PROCEDURE — 99214 OFFICE O/P EST MOD 30 MIN: CPT | Performed by: PHYSICIAN ASSISTANT

## 2025-03-17 NOTE — TELEPHONE ENCOUNTER
Hub to relay.       Called PCP and they did not have a CMP or BMP since December and will send what they have.     Called pt to advise him that he would need to get the BMP done that was ordered in December by Ac. No answer LM.

## 2025-03-17 NOTE — PROGRESS NOTES
Thelma Yoo APRN  Meg Trejo  2025    Patient Active Problem List   Diagnosis    Right ankle pain    PAC (premature atrial contraction)    Leg swelling    Bradycardia    Essential hypertension    Dizziness    Nausea and vomiting       Dear Thelma Yoo APRN:    Subjective     History of Present Illness:    Chief Complaint   Patient presents with    Follow-up     ROUTINE       Meg Trejo is a pleasant 80 y.o. male with a past medical history significant for essential hypertension and bradycardia. He comes in today for cardiology follow-up.      Barrera comes in today reporting he has been doing well he has no open complaints.  Denies any chest pains, shortness of breath, dizziness, or syncope.  Blood pressure was initially elevated on automatic check however I rechecked manually once and was much better.  He reports he checks on a regular basis and typically is between 130-135 mmHg systolic.        No Known Allergies:      Current Outpatient Medications:     Ibuprofen-diphenhydrAMINE Cit (ADVIL PM PO), Take 1 tablet by mouth Every Night., Disp: , Rfl:     Multiple Vitamins-Minerals (ICAPS AREDS 2 PO), Take  by mouth., Disp: , Rfl:     sildenafil (VIAGRA) 50 MG tablet, Take 1 tablet by mouth As Needed., Disp: , Rfl:     Testosterone Cypionate 200 MG/ML kit, Inject 1 mL into the appropriate muscle as directed by prescriber Every 14 (Fourteen) Days., Disp: , Rfl:     losartan (COZAAR) 25 MG tablet, Take 1 tablet by mouth Daily for 30 days., Disp: 90 tablet, Rfl: 2    The following portions of the patient's history were reviewed and updated as appropriate: allergies, current medications, past family history, past medical history, past social history, past surgical history and problem list.    Social History     Tobacco Use    Smoking status: Former     Current packs/day: 0.00     Types: Cigarettes     Quit date: 3/21/1988     Years since quittin.0    Smokeless tobacco: Never   Vaping  "Use    Vaping status: Never Used   Substance Use Topics    Alcohol use: Yes     Comment: social    Drug use: No         Objective   Vitals:    03/17/25 1106 03/17/25 1130   BP: (!) 190/96 145/88   Pulse: 72    SpO2: 98%    Height: 175.3 cm (69\")      Body mass index is 27.73 kg/m².    ROS    Constitutional:       General: Not in acute distress.     Appearance: Healthy appearance. Well-developed and not in distress. Not diaphoretic.   Eyes:      Conjunctiva/sclera: Conjunctivae normal.      Pupils: Pupils are equal, round, and reactive to light.   HENT:      Head: Normocephalic and atraumatic.   Neck:      Vascular: No carotid bruit or JVD.   Pulmonary:      Effort: Pulmonary effort is normal. No respiratory distress.      Breath sounds: Normal breath sounds.   Cardiovascular:      Normal rate. Regular rhythm.   Edema:     Peripheral edema absent.   Skin:     General: Skin is cool.   Neurological:      Mental Status: Alert, oriented to person, place, and time and oriented to person, place and time.         Lab Results   Component Value Date     08/21/2024    K 3.8 08/21/2024     08/21/2024    CO2 26.5 08/21/2024    BUN 19 08/21/2024    CREATININE 0.99 08/21/2024    GLUCOSE 92 08/21/2024    CALCIUM 9.6 08/21/2024    AST 18 08/21/2024    ALT 20 08/21/2024    ALKPHOS 48 08/21/2024     No results found for: \"CKTOTAL\"  Lab Results   Component Value Date    WBC 7.4 02/24/2025    HGB 16.9 02/24/2025    HCT 50.7 02/24/2025     02/24/2025     No results found for: \"INR\"  Lab Results   Component Value Date    MG 2.3 08/21/2024     Lab Results   Component Value Date    TSH 1.640 08/21/2024      No results found for: \"BNP\"    During this visit the following were done:  Labs Reviewed []    Labs Ordered []    Radiology Reports Reviewed []    Radiology Ordered []    PCP Records Reviewed []    Referring Provider Records Reviewed []    ER Records Reviewed []    Hospital Records Reviewed []    History Obtained From " Family []    Radiology Images Reviewed []    Other Reviewed []    Records Requested []       Procedures    Assessment & Plan    Diagnosis Plan   1. Essential hypertension        2. PAC (premature atrial contraction)                 Recommendations:  Essential hypertension  Slightly above goal but much better at home I asked him to bring in a 1 week blood pressure log he expressed understanding  Dyslipidemia  LDL markedly elevated I do think he would benefit from statin therapy however he is reluctant to be on further pharmacotherapy.  Discussed improved diet.  PACs  Asymptomatic denies any palpitations    No follow-ups on file.    As always, I appreciate very much the opportunity to participate in the cardiovascular care of your patients.      With Best Regards,    Ac Adan PA-C

## 2025-03-17 NOTE — TELEPHONE ENCOUNTER
"Name: Maya, Fate GIOVANNA \"Barrera\"      Relationship: Self      Best Callback Number: [phone]       HUB PROVIDED THE RELAY MESSAGE FROM THE OFFICE      PATIENT: HAS FURTHER QUESTIONS AND WOULD LIKE A CALL BACK AT THE FOLLOWING PHONE NUMBER     ADDITIONAL INFORMATION: WILL DISCUSS WITH KUNAL TODAY  "

## 2025-03-25 ENCOUNTER — TELEPHONE (OUTPATIENT)
Dept: CARDIOLOGY | Facility: CLINIC | Age: 80
End: 2025-03-25
Payer: MEDICARE

## 2025-03-25 NOTE — TELEPHONE ENCOUNTER
Called to let us know his blood pressure is running in the 145/80 and 164/94 was suppose to let rossana know

## 2025-04-01 ENCOUNTER — TELEPHONE (OUTPATIENT)
Dept: CARDIOLOGY | Facility: CLINIC | Age: 80
End: 2025-04-01
Payer: MEDICARE

## 2025-04-01 NOTE — TELEPHONE ENCOUNTER
asking if rossana was going to call him in something different for his blood pressure Luzma was saying its still running a little high

## 2025-04-02 RX ORDER — LOSARTAN POTASSIUM 50 MG/1
50 TABLET ORAL
Qty: 90 TABLET | Refills: 2 | Status: SHIPPED | OUTPATIENT
Start: 2025-04-02 | End: 2025-12-28

## 2025-04-07 ENCOUNTER — TELEPHONE (OUTPATIENT)
Dept: CARDIOLOGY | Facility: CLINIC | Age: 80
End: 2025-04-07
Payer: MEDICARE

## 2025-04-07 RX ORDER — AMLODIPINE BESYLATE 5 MG/1
5 TABLET ORAL DAILY
Qty: 30 TABLET | Refills: 11 | Status: SHIPPED | OUTPATIENT
Start: 2025-04-07

## 2025-04-07 NOTE — TELEPHONE ENCOUNTER
"Name: Maya Meg GIOVANNA \"Barrera\"      Relationship: Self      Best Callback Number: [phone]       HUB PROVIDED THE RELAY MESSAGE FROM THE OFFICE      PATIENT: VOICED UNDERSTANDING AND HAS NO FURTHER QUESTIONS AT THIS TIME    ADDITIONAL INFORMATION: PLEASE SEND INTO [facility] IN [place]   " no

## 2025-04-07 NOTE — TELEPHONE ENCOUNTER
Hub to relay.     Called pt to advise him that Ac is starting him on Amlodipine 5 mg QD in addition to his losartan. No answer LM.

## 2025-04-07 NOTE — TELEPHONE ENCOUNTER
Called pt and he stated he is taking Losartan 50 mg and he has not been taking it an hour after meds. I advised him to start doing that. He expressed understanding.